# Patient Record
Sex: FEMALE | Employment: FULL TIME | URBAN - METROPOLITAN AREA
[De-identification: names, ages, dates, MRNs, and addresses within clinical notes are randomized per-mention and may not be internally consistent; named-entity substitution may affect disease eponyms.]

---

## 2017-02-03 PROBLEM — R56.9 SEIZURE (HCC): Status: ACTIVE | Noted: 2017-02-03

## 2017-02-03 PROBLEM — G43.009 NONINTRACTABLE MIGRAINE, UNSPECIFIED MIGRAINE TYPE: Status: ACTIVE | Noted: 2017-02-03

## 2018-06-14 PROBLEM — M75.101 ROTATOR CUFF SYNDROME OF RIGHT SHOULDER: Status: ACTIVE | Noted: 2018-06-14

## 2019-11-02 ENCOUNTER — HOSPITAL ENCOUNTER (EMERGENCY)
Facility: HOSPITAL | Age: 59
Discharge: HOME | End: 2019-11-02
Attending: EMERGENCY MEDICINE
Payer: COMMERCIAL

## 2019-11-02 ENCOUNTER — APPOINTMENT (OUTPATIENT)
Dept: CT IMAGING | Facility: HOSPITAL | Age: 59
End: 2019-11-02
Payer: COMMERCIAL

## 2019-11-02 VITALS
RESPIRATION RATE: 18 BRPM | SYSTOLIC BLOOD PRESSURE: 146 MMHG | TEMPERATURE: 98 F | BODY MASS INDEX: 28 KG/M2 | HEART RATE: 69 BPM | WEIGHT: 150 LBS | OXYGEN SATURATION: 93 % | DIASTOLIC BLOOD PRESSURE: 85 MMHG

## 2019-11-02 DIAGNOSIS — S06.0X0A CONCUSSION WITHOUT LOSS OF CONSCIOUSNESS, INITIAL ENCOUNTER: Primary | ICD-10-CM

## 2019-11-02 PROCEDURE — 70450 CT HEAD/BRAIN W/O DYE: CPT

## 2019-11-02 PROCEDURE — 99284 EMERGENCY DEPT VISIT MOD MDM: CPT

## 2019-11-02 NOTE — ED INITIAL ASSESSMENT (HPI)
Fall on Wednesday. Hit head. No LOC. Sent here by PMD.  Pt has dizziness, feels like she is on drugs.

## 2019-11-02 NOTE — ED PROVIDER NOTES
Patient Seen in: [facility] Emergency Department      History   Patient presents with:  Trauma (cardiovascular, musculoskeletal)    Stated Complaint: head injury    HPI    57-year-old female comes to the hospital complaint of having difficulty with except as noted above.     Physical Exam     ED Triage Vitals [11/02/19 1211]   /84   Pulse 65   Resp 18   Temp 98 °F (36.7 °C)   Temp src    SpO2 97 %   O2 Device None (Room air)       Current:/71   Pulse 59   Temp 98 °F (36.7 °C)   Resp 18   W Tristian Evangelista MD on 11/02/2019 at 13:42     Approved by: Fabrice Hilliard MD on 11/02/2019 at 13:42            Patient symptoms are consistent with concussion. Patient discharged home with instructions for further outpatient management at this time.         MDM

## 2019-12-09 PROBLEM — S66.902A: Status: ACTIVE | Noted: 2019-12-09

## 2019-12-09 PROBLEM — M79.644 FINGER PAIN, RIGHT: Status: ACTIVE | Noted: 2019-12-09

## 2019-12-09 PROBLEM — M19.049 ARTHRITIS OF FINGER: Status: ACTIVE | Noted: 2019-12-09

## 2020-11-04 NOTE — H&P (VIEW-ONLY)
11/4/2020    Patient presents with:  Consult: colonoscopy       HPI:    Vida Ferreira is a 62year old female who presents today for colonoscopy evaluation. Patient denies any abdominal pain. No change in bowel movements. No rectal bleeding.   No fam Onset   • Breast Cancer Self 47        DCIS       Social History    Tobacco Use      Smoking status: Never Smoker      Smokeless tobacco: Never Used    Alcohol use: Yes      Comment: Socially    Drug use: No      ROS:    10 point review performed with pert

## 2020-11-22 ENCOUNTER — APPOINTMENT (OUTPATIENT)
Dept: LAB | Facility: HOSPITAL | Age: 60
End: 2020-11-22
Attending: SURGERY
Payer: COMMERCIAL

## 2020-11-22 DIAGNOSIS — Z12.11 SCREEN FOR COLON CANCER: ICD-10-CM

## 2020-11-24 ENCOUNTER — ANESTHESIA EVENT (OUTPATIENT)
Dept: ENDOSCOPY | Facility: HOSPITAL | Age: 60
End: 2020-11-24
Payer: COMMERCIAL

## 2020-11-24 ENCOUNTER — ANESTHESIA (OUTPATIENT)
Dept: ENDOSCOPY | Facility: HOSPITAL | Age: 60
End: 2020-11-24
Payer: COMMERCIAL

## 2020-11-24 ENCOUNTER — HOSPITAL ENCOUNTER (OUTPATIENT)
Facility: HOSPITAL | Age: 60
Setting detail: HOSPITAL OUTPATIENT SURGERY
Discharge: HOME | End: 2020-11-24
Attending: SURGERY | Admitting: SURGERY
Payer: COMMERCIAL

## 2020-11-24 VITALS
HEART RATE: 64 BPM | OXYGEN SATURATION: 97 % | SYSTOLIC BLOOD PRESSURE: 106 MMHG | DIASTOLIC BLOOD PRESSURE: 66 MMHG | TEMPERATURE: 98 F | RESPIRATION RATE: 16 BRPM

## 2020-11-24 DIAGNOSIS — Z12.11 SCREEN FOR COLON CANCER: Primary | ICD-10-CM

## 2020-11-24 DIAGNOSIS — K59.00 CONSTIPATION, UNSPECIFIED CONSTIPATION TYPE: ICD-10-CM

## 2020-11-24 PROCEDURE — 0DJD8ZZ INSPECTION OF LOWER INTESTINAL TRACT, VIA NATURAL OR ARTIFICIAL OPENING ENDOSCOPIC: ICD-10-PCS | Performed by: SURGERY

## 2020-11-24 RX ORDER — SODIUM CHLORIDE, SODIUM LACTATE, POTASSIUM CHLORIDE, CALCIUM CHLORIDE 600; 310; 30; 20 MG/100ML; MG/100ML; MG/100ML; MG/100ML
INJECTION, SOLUTION INTRAVENOUS CONTINUOUS
Status: CANCELLED | OUTPATIENT
Start: 2020-11-24

## 2020-11-24 RX ORDER — SODIUM CHLORIDE, SODIUM LACTATE, POTASSIUM CHLORIDE, CALCIUM CHLORIDE 600; 310; 30; 20 MG/100ML; MG/100ML; MG/100ML; MG/100ML
INJECTION, SOLUTION INTRAVENOUS CONTINUOUS
Status: DISCONTINUED | OUTPATIENT
Start: 2020-11-24 | End: 2020-11-24

## 2020-11-24 RX ORDER — NALOXONE HYDROCHLORIDE 0.4 MG/ML
80 INJECTION, SOLUTION INTRAMUSCULAR; INTRAVENOUS; SUBCUTANEOUS AS NEEDED
Status: CANCELLED | OUTPATIENT
Start: 2020-11-24 | End: 2020-11-24

## 2020-11-24 RX ORDER — LIDOCAINE HYDROCHLORIDE 10 MG/ML
INJECTION, SOLUTION EPIDURAL; INFILTRATION; INTRACAUDAL; PERINEURAL AS NEEDED
Status: DISCONTINUED | OUTPATIENT
Start: 2020-11-24 | End: 2020-11-24 | Stop reason: SURG

## 2020-11-24 RX ADMIN — LIDOCAINE HYDROCHLORIDE 50 MG: 10 INJECTION, SOLUTION EPIDURAL; INFILTRATION; INTRACAUDAL; PERINEURAL at 12:15:00

## 2020-11-24 RX ADMIN — SODIUM CHLORIDE, SODIUM LACTATE, POTASSIUM CHLORIDE, CALCIUM CHLORIDE: 600; 310; 30; 20 INJECTION, SOLUTION INTRAVENOUS at 12:37:00

## 2020-11-24 NOTE — ANESTHESIA PREPROCEDURE EVALUATION
PRE-OP EVALUATION    Patient Name: Justine Wong    Pre-op Diagnosis: Screen for colon cancer [Z12.11]    Procedure(s):  COLONOSCOPY POSSIBLE BIOPSY POSSIBLE POLYPECTOMY    Surgeon(s) and Role:     Balwinder Clarke MD - Primary    Pre-op vitals reviewed ROM: full Cardiovascular    Cardiovascular exam normal.         Dental    No notable dental history. Pulmonary    Pulmonary exam normal.                 Other findings            ASA: 2   Plan: MAC  NPO status verified and patient meets guidelines.

## 2020-11-24 NOTE — OPERATIVE REPORT
[facility]    PATIENT'S NAME: Viviana Hernandez   ATTENDING PHYSICIAN: Kennedi Goel M.D. OPERATING PHYSICIAN: Kennedi Goel M.D.    PATIENT ACCOUNT#:   [de-identified]    LOCATION:  ENDO  ENDO POOL ROOMS 5 EDWP  MEDICAL RECORD #:   [id number]       DATE OF M.D.  d: 11/24/2020 12:40:28  t: 11/24/2020 13:06:20  [facility] [id number]  /

## 2020-11-24 NOTE — BRIEF OP NOTE
Pre-Operative Diagnosis: Screen for colon cancer [Z12.11]     Post-Operative Diagnosis: diverticulosis and incomplete colonoscopy       Procedure Performed:   Procedure(s):  COLONOSCOPY    Surgeon(s) and Role:     Kadeem Hoffman MD - Primary    Assistant(s

## 2020-11-24 NOTE — INTERVAL H&P NOTE
Pre-op Diagnosis: Screen for colon cancer [Z12.11]    The above referenced H&P was reviewed by Gabrielle Nickerson MD on 11/24/2020, the patient was examined and no significant changes have occurred in the patient's condition since the H&P was performed.   I discus

## 2021-04-10 ENCOUNTER — APPOINTMENT (OUTPATIENT)
Dept: GENERAL RADIOLOGY | Facility: HOSPITAL | Age: 61
DRG: 177 | End: 2021-04-10
Attending: EMERGENCY MEDICINE
Payer: COMMERCIAL

## 2021-04-10 ENCOUNTER — HOSPITAL ENCOUNTER (INPATIENT)
Facility: HOSPITAL | Age: 61
LOS: 5 days | Discharge: HOME | DRG: 177 | End: 2021-04-15
Attending: EMERGENCY MEDICINE | Admitting: HOSPITALIST
Payer: COMMERCIAL

## 2021-04-10 DIAGNOSIS — R09.02 HYPOXIA: ICD-10-CM

## 2021-04-10 DIAGNOSIS — J12.82 PNEUMONIA DUE TO COVID-19 VIRUS: Primary | ICD-10-CM

## 2021-04-10 DIAGNOSIS — U07.1 PNEUMONIA DUE TO COVID-19 VIRUS: Primary | ICD-10-CM

## 2021-04-10 PROCEDURE — 71045 X-RAY EXAM CHEST 1 VIEW: CPT | Performed by: EMERGENCY MEDICINE

## 2021-04-10 PROCEDURE — XW033E5 INTRODUCTION OF REMDESIVIR ANTI-INFECTIVE INTO PERIPHERAL VEIN, PERCUTANEOUS APPROACH, NEW TECHNOLOGY GROUP 5: ICD-10-PCS | Performed by: INTERNAL MEDICINE

## 2021-04-10 PROCEDURE — 3E0333Z INTRODUCTION OF ANTI-INFLAMMATORY INTO PERIPHERAL VEIN, PERCUTANEOUS APPROACH: ICD-10-PCS | Performed by: INTERNAL MEDICINE

## 2021-04-10 RX ORDER — LATANOPROST 50 UG/ML
1 SOLUTION/ DROPS OPHTHALMIC NIGHTLY
Status: DISCONTINUED | OUTPATIENT
Start: 2021-04-10 | End: 2021-04-15

## 2021-04-10 RX ORDER — ENOXAPARIN SODIUM 100 MG/ML
40 INJECTION SUBCUTANEOUS DAILY
Status: DISCONTINUED | OUTPATIENT
Start: 2021-04-10 | End: 2021-04-15

## 2021-04-10 RX ORDER — DEXAMETHASONE SODIUM PHOSPHATE 4 MG/ML
6 VIAL (ML) INJECTION ONCE
Status: COMPLETED | OUTPATIENT
Start: 2021-04-10 | End: 2021-04-10

## 2021-04-10 RX ORDER — LAMOTRIGINE 25 MG/1
75 TABLET ORAL NIGHTLY
Status: DISCONTINUED | OUTPATIENT
Start: 2021-04-10 | End: 2021-04-11

## 2021-04-10 RX ORDER — ALBUTEROL SULFATE 90 UG/1
4 AEROSOL, METERED RESPIRATORY (INHALATION) EVERY 4 HOURS PRN
Status: DISCONTINUED | OUTPATIENT
Start: 2021-04-10 | End: 2021-04-15

## 2021-04-10 RX ORDER — GUAIFENESIN 600 MG
600 TABLET, EXTENDED RELEASE 12 HR ORAL 2 TIMES DAILY
Status: DISCONTINUED | OUTPATIENT
Start: 2021-04-10 | End: 2021-04-15

## 2021-04-10 RX ORDER — AMITRIPTYLINE HYDROCHLORIDE 10 MG/1
10 TABLET, FILM COATED ORAL NIGHTLY
Status: DISCONTINUED | OUTPATIENT
Start: 2021-04-10 | End: 2021-04-10

## 2021-04-10 RX ORDER — ACETAMINOPHEN 500 MG
1000 TABLET ORAL ONCE
Status: COMPLETED | OUTPATIENT
Start: 2021-04-10 | End: 2021-04-10

## 2021-04-10 RX ORDER — DEXAMETHASONE SODIUM PHOSPHATE 4 MG/ML
6 VIAL (ML) INJECTION EVERY 24 HOURS
Status: DISCONTINUED | OUTPATIENT
Start: 2021-04-11 | End: 2021-04-12

## 2021-04-10 RX ORDER — ZINC SULFATE 50(220)MG
220 CAPSULE ORAL 2 TIMES DAILY
Status: DISCONTINUED | OUTPATIENT
Start: 2021-04-10 | End: 2021-04-15

## 2021-04-10 RX ORDER — ASCORBIC ACID 500 MG
1000 TABLET ORAL DAILY
Status: DISCONTINUED | OUTPATIENT
Start: 2021-04-10 | End: 2021-04-15

## 2021-04-10 RX ORDER — LAMOTRIGINE 100 MG/1
100 TABLET ORAL DAILY
Status: DISCONTINUED | OUTPATIENT
Start: 2021-04-10 | End: 2021-04-10

## 2021-04-10 RX ORDER — ACETAMINOPHEN 160 MG
2000 TABLET,DISINTEGRATING ORAL DAILY
Status: DISCONTINUED | OUTPATIENT
Start: 2021-04-10 | End: 2021-04-15

## 2021-04-10 RX ORDER — BRIMONIDINE TARTRATE 2 MG/ML
1 SOLUTION/ DROPS OPHTHALMIC 2 TIMES DAILY
Status: DISCONTINUED | OUTPATIENT
Start: 2021-04-10 | End: 2021-04-15

## 2021-04-10 RX ORDER — SODIUM CHLORIDE 9 MG/ML
INJECTION, SOLUTION INTRAVENOUS CONTINUOUS
Status: ACTIVE | OUTPATIENT
Start: 2021-04-10 | End: 2021-04-10

## 2021-04-10 RX ORDER — LAMOTRIGINE 100 MG/1
100 TABLET ORAL NIGHTLY
Status: DISCONTINUED | OUTPATIENT
Start: 2021-04-10 | End: 2021-04-10

## 2021-04-10 NOTE — ED QUICK NOTES
Report to [facility]. Transport paged. Halima informed of 3rd troponin draw at 1659    Pt updated on room number and eta to floor. No distress noted at this time. Pt denies any needs.

## 2021-04-10 NOTE — H&P
[facility]  History and Physical     Terris Fess Patient Status:  Inpatient    1960 MRN [id number]   [facility] [place] 5NW-A Attending Sterling Quijano MD   Hosp Day # 0 PCP None Pcp     Chief Complaint: Shortness of breath coug Age 3       Social History:  reports that she has never smoked. She has never used smokeless tobacco. She reports current alcohol use. She reports current drug use. Drug: Cannabis.     Family History:   Family History   Problem Relation Age of Onset   • Isabel murmurs, rubs or gallops. Equal pulses. Chest and Back: No tenderness or deformity. Abdomen: Soft, nontender, nondistended. Positive bowel sounds. No rebound, guarding or organomegaly. Neurologic: No focal neurological deficits.  CNII-XII grossly intac patient and ED physician    Susana Vergara MD  4/10/2021

## 2021-04-10 NOTE — ED PROVIDER NOTES
Patient Seen in: [facility] Emergency Department      History   Patient presents with:  Covid    Stated Complaint: COVID Positive. In bed x 8 days. Decreased intake.     HPI/Subjective:   HPI    66-year-old female presents emerged with chief compla Smoking status: Never Smoker      Smokeless tobacco: Never Used    Vaping Use      Vaping Use: Some days        Substances: THC    Alcohol use: Yes      Comment: Socially    Drug use:  No             Review of Systems    Positive for stated complaint: COVID components:     (*)     All other components within normal limits   C-REACTIVE PROTEIN - Abnormal; Notable for the following components:    C-Reactive Protein 11.10 (*)     All other components within normal limits   FERRITIN - Abnormal; Notable for IV established, placed on cardiac monitor and pulse ox. Patient given IV fluid hydration. Patient was noted to be hypoxic with oxygen saturation of 88% when speaking, placed on supplemental nasal cannula oxygen.   Chest x-ray performed which revealed mode

## 2021-04-10 NOTE — CONSULTS
INFECTIOUS DISEASE TELEMEDICINE CONSULT NOTE    Christiannemadhu Romero Patient Status:  Inpatient    1960 MRN [id number]   [facility] 5NW-A Attending Myla Boswell MD   [facility] Day # 0 P used smokeless tobacco. She reports current alcohol use. She reports current drug use. Drug: Cannabis.     Allergies:  No Known Allergies    Medications:    Current Facility-Administered Medications:   •  0.9% NaCl infusion, , Intravenous, Continuous  •  Am admission)    Reviewed    Imaging:  X-Ray    Imaging results reviewed     Impression:  Patient Active Problem List:     Plantar fascial fibromatosis     Seizure (HCC)     Nonintractable migraine, unspecified migraine type     Rotator cuff syndrome of right

## 2021-04-11 PROCEDURE — 99232 SBSQ HOSP IP/OBS MODERATE 35: CPT | Performed by: STUDENT IN AN ORGANIZED HEALTH CARE EDUCATION/TRAINING PROGRAM

## 2021-04-11 RX ORDER — LAMOTRIGINE 25 MG/1
75 TABLET ORAL DAILY
Status: DISCONTINUED | OUTPATIENT
Start: 2021-04-11 | End: 2021-04-15

## 2021-04-11 RX ORDER — ONDANSETRON 2 MG/ML
4 INJECTION INTRAMUSCULAR; INTRAVENOUS EVERY 4 HOURS PRN
Status: DISCONTINUED | OUTPATIENT
Start: 2021-04-11 | End: 2021-04-15

## 2021-04-11 RX ORDER — BENZONATATE 100 MG/1
100 CAPSULE ORAL 3 TIMES DAILY PRN
Status: DISCONTINUED | OUTPATIENT
Start: 2021-04-11 | End: 2021-04-15

## 2021-04-11 NOTE — COVID NURSING ASSESSMENT
COVID-19 Daily Discharge Readiness-Nursing    O2 Sat at Rest:     97%  2L NC  O2 Sat with Exertion:    % on    liters   Temperature max from last 24 hrs: Temp (24hrs), Av °F (37.2 °C), Min:98.1 °F (36.7 °C), Max:100.6 °F (38.1 °C)    Inflammatory Marke

## 2021-04-11 NOTE — PROGRESS NOTES
INFECTIOUS DISEASE TELEMEDICINE PROGRESS NOTE    Esdras Jacobso Patient Status:  Inpatient    1960 MRN [id number]   [facility] 5NW-A Attending Michelle oYu MD   [facility] Day # 1 pressure 105/60, pulse 63, temperature 97.8 °F (36.6 °C), temperature source Oral, resp. rate 18, weight 155 lb 6.8 oz (70.5 kg), last menstrual period 01/06/2008, SpO2 93 %, not currently breastfeeding.     Physical examination deferred    Laboratory Data:

## 2021-04-11 NOTE — COVID NURSING ASSESSMENT
COVID-19 Daily Discharge Readiness-Nursing    O2 Sat at Rest:     92% on 1L NC   O2 Sat with Exertion:  Pts O2 desatted to 83% on 1L NC, requiring 4L NC to maintain oxygen saturation above 90%  Temperature max from last 24 hrs: Temp (24hrs), Av.1 °F (3 plan of care.

## 2021-04-11 NOTE — PLAN OF CARE
Problem: Patient/Family Goals  Goal: Patient/Family Long Term Goal  Description: Patient's Long Term Goal: To go home    Interventions:  - remdesivir  -I/S  -Prone  -Steroids  - See additional Care Plan goals for specific interventions  Outcome: Progress

## 2021-04-11 NOTE — PROGRESS NOTES
[facility]  Progress Note     Christianne Romero Patient Status:  Inpatient    1960 MRN [id number]   [facility] 5NW-A Attending Reece Friedman MD   Hosp Day # 1 PCP None Pcp     Chief Complaint: COVID     S: Patient  Feels wea 04/10/21  1107   PCT 0.05       Cardiac  Recent Labs   Lab 04/10/21  1107 04/10/21  1247 04/10/21  1703   TROP <0.045 <0.045 <0.045   PBNP 132*  --   --        Creatinine Kinase  Recent Labs   Lab 04/10/21  1107          Inflammatory Markers  Recent

## 2021-04-12 PROCEDURE — 99232 SBSQ HOSP IP/OBS MODERATE 35: CPT | Performed by: STUDENT IN AN ORGANIZED HEALTH CARE EDUCATION/TRAINING PROGRAM

## 2021-04-12 RX ORDER — PANTOPRAZOLE SODIUM 40 MG/1
40 TABLET, DELAYED RELEASE ORAL
Status: DISCONTINUED | OUTPATIENT
Start: 2021-04-12 | End: 2021-04-15

## 2021-04-12 RX ORDER — ECHINACEA PURPUREA EXTRACT 125 MG
1 TABLET ORAL
Status: DISCONTINUED | OUTPATIENT
Start: 2021-04-12 | End: 2021-04-15

## 2021-04-12 RX ORDER — ACETAMINOPHEN 325 MG/1
650 TABLET ORAL EVERY 6 HOURS PRN
Status: DISCONTINUED | OUTPATIENT
Start: 2021-04-12 | End: 2021-04-15

## 2021-04-12 RX ORDER — DOCUSATE SODIUM 100 MG/1
100 CAPSULE, LIQUID FILLED ORAL 2 TIMES DAILY
Status: DISCONTINUED | OUTPATIENT
Start: 2021-04-12 | End: 2021-04-15

## 2021-04-12 RX ORDER — DEXAMETHASONE SODIUM PHOSPHATE 4 MG/ML
6 VIAL (ML) INJECTION
Status: DISCONTINUED | OUTPATIENT
Start: 2021-04-13 | End: 2021-04-15

## 2021-04-12 NOTE — COVID NURSING ASSESSMENT
COVID-19 Daily Discharge Readiness-Nursing    O2 Sat at Rest:  92% on 2L   O2 Sat with Exertion: 91% on 5L  Temperature max from last 24 hrs: Temp (24hrs), Av.1 °F (36.7 °C), Min:98 °F (36.7 °C), Max:98.2 °F (36.8 °C)    Inflammatory Markers:   Recent diagnosis. .. [x] Pneumonia     [] COPD    [] Home Health set up. [x] Care partner identified and updated with the plan of care.

## 2021-04-12 NOTE — CM/SW NOTE
04/12/21 1434   CM/SW Screening   Referral [id number] staff; Chart review;Nursing rounds   Patient's Mental Status Alert;Oriented   Patient's [address]   Patient lives with Spouse   Patient Status Prior to A

## 2021-04-12 NOTE — PLAN OF CARE
Assumed carre at [address]. Pt a/ox4, soft spoken. VSS. NSR/SB per tele. O2 at 2-4l/nc in use. SOB w/ activity. On day 2 decadron and remdesivir. Denies pain. Pt updated w/ POC. Call light in reach. Needs attended to. Contact/droplet isolation maintained.  Will

## 2021-04-12 NOTE — PLAN OF CARE
Problem: Patient/Family Goals  Goal: Patient/Family Long Term Goal  Description: Patient's Long Term Goal: To go home    Interventions:  - remdesivir  -I/S  -Prone  -Steroids  - See additional Care Plan goals for specific interventions  Outcome: Progress and/or relaxation techniques  - Monitor for opioid side effects  - Notify MD/LIP if interventions unsuccessful or patient reports new pain  - Anticipate increased pain with activity and pre-medicate as appropriate  Outcome: Progressing

## 2021-04-12 NOTE — PAYOR COMM NOTE
Appropriate for inpatient status per guidelines for cough, SOB and fatigue due to COVID pneumonia.      --------------  ADMISSION REVIEW     Payor: [address] [place] [address] #:  [id number]  Authorization Number: [id number]       ED Provider Notes SURGERY PROCEDURE  Age 23   • STEREOTACTIC BREAST BIOPSY  12-13-07 [facility]    Bilateral   • STEREOTACTIC BREAST BIOPSY  2-16-09 [facility]    Right Breast 10 o'clock posterior   • TONSILLECTOMY  Age 3       Review of Systems    Positive for stated complai components:     (*)     All other components within normal limits   C-REACTIVE PROTEIN - Abnormal; Notable for the following components:    C-Reactive Protein 11.10 (*)     All other components within normal limits   FERRITIN - Abnormal; Notable for is a 56year old female with 80s history of generalized weakness fatigue shortness of breath. Patient was tested for Covid at the local pharmacy and she was +8 days ago.   She is also complaining of nausea decreased appetite and oral intake as well as loos Estimated Creatinine Clearance: 51.8 mL/min (based on SCr of 0.83 mg/dL).     COVID-19  Lab Results   Component Value Date    COVID19 Detected (A) 04/10/2021    COVID19 Not Detected 11/22/2020       Pro-Calcitonin  Recent Labs   Lab 04/10/21  1107   P markers   3. Steroids   4. Remdesivir 4/10  5. ID on CS  2. Diarrhea   3. HypoNa  4. H/o migraines  5. H/o breast ca                4/11 INF DIS    ASSESSMENT:     1. COVID-19 pneumonia  2. Acute hypoxia  3. Elevated inflammatory markers  4.  Liver enzyme e

## 2021-04-12 NOTE — PROGRESS NOTES
INFECTIOUS DISEASE TELEMEDICINE PROGRESS NOTE    Kirstinchelle Jauregui Patient Status:  Inpatient    1960 MRN [id number]   [facility] 5NW-A Attending Yonatan Almaraz MD   [facility] Day # 2 MCG/ACT inhaler 4 puff, 4 puff, Inhalation, Q4H PRN  •  zinc sulfate (ZINCATE) cap 220 mg, 220 mg, Oral, BID  •  Vitamin D3 (Cholecalciferol) cap 2,000 Units, 2,000 Units, Oral, Daily  •  ascorbic acid (VITAMIN C) tab 1,000 mg, 1,000 mg, Oral, Daily    Rev Elevated inflammatory markers  4. Liver enzyme elevation-resolved  5. BRCA history  6.  Loose stools- rule out C.difficile vs. COVID-19 associated       PLAN:    -Continue Remdesivir day 3 for treatment of COVID-19 pneumonia  -Continue Dexamethasone day 3 f

## 2021-04-12 NOTE — PROGRESS NOTES
[facility]  Progress Note     Jenaro Newby Patient Status:  Inpatient    1960 MRN [id number]   [facility] 5NW-A Attending Sari Blackwell MD   Hosp Day # 2 PCP None Pcp     Chief Complaint: COVID     S: Patient  Weak, dry Detected (A) 04/10/2021    COVID19 Not Detected 11/22/2020       Pro-Calcitonin  Recent Labs   Lab 04/10/21  1107   PCT 0.05       Cardiac  Recent Labs   Lab 04/10/21  1107 04/10/21  1247 04/10/21  1703   TROP <0.045 <0.045 <0.045   PBNP 132*  --   --

## 2021-04-13 ENCOUNTER — APPOINTMENT (OUTPATIENT)
Dept: CT IMAGING | Facility: HOSPITAL | Age: 61
DRG: 177 | End: 2021-04-13
Attending: INTERNAL MEDICINE
Payer: COMMERCIAL

## 2021-04-13 ENCOUNTER — APPOINTMENT (OUTPATIENT)
Dept: GENERAL RADIOLOGY | Facility: HOSPITAL | Age: 61
DRG: 177 | End: 2021-04-13
Attending: STUDENT IN AN ORGANIZED HEALTH CARE EDUCATION/TRAINING PROGRAM
Payer: COMMERCIAL

## 2021-04-13 PROCEDURE — 99232 SBSQ HOSP IP/OBS MODERATE 35: CPT | Performed by: STUDENT IN AN ORGANIZED HEALTH CARE EDUCATION/TRAINING PROGRAM

## 2021-04-13 PROCEDURE — 71275 CT ANGIOGRAPHY CHEST: CPT | Performed by: INTERNAL MEDICINE

## 2021-04-13 PROCEDURE — 74018 RADEX ABDOMEN 1 VIEW: CPT | Performed by: STUDENT IN AN ORGANIZED HEALTH CARE EDUCATION/TRAINING PROGRAM

## 2021-04-13 RX ORDER — BISACODYL 10 MG
10 SUPPOSITORY, RECTAL RECTAL ONCE
Status: COMPLETED | OUTPATIENT
Start: 2021-04-13 | End: 2021-04-13

## 2021-04-13 RX ORDER — POLYETHYLENE GLYCOL 3350 17 G/17G
17 POWDER, FOR SOLUTION ORAL DAILY
Status: DISCONTINUED | OUTPATIENT
Start: 2021-04-13 | End: 2021-04-15

## 2021-04-13 RX ORDER — SORBITOL SOLUTION 70 %
30 SOLUTION, ORAL MISCELLANEOUS ONCE
Status: COMPLETED | OUTPATIENT
Start: 2021-04-13 | End: 2021-04-13

## 2021-04-13 NOTE — PROGRESS NOTES
COVID-19 Daily Discharge Readiness-Nursing    O2 Sat at Rest:     93% on 2L NC  O2 Sat with Exertion:    % on    liters   Temperature max from last 24 hrs: Temp (24hrs), Av.8 °F (36.6 °C), Min:97.3 °F (36.3 °C), Max:98.1 °F (36.7 °C)    Inflammatory Ma

## 2021-04-13 NOTE — PROGRESS NOTES
[facility]  Progress Note     Esdras Roger Patient Status:  Inpatient    1960 MRN [id number]   [facility] 5NW-A Attending Ronna Owen MD   Hosp Day # 3 PCP None Pcp     Chief Complaint: COVID     S: Patient  W/ some c Value Date    COVID19 Detected (A) 04/10/2021    COVID19 Not Detected 11/22/2020       Pro-Calcitonin  Recent Labs   Lab 04/10/21  1107   PCT 0.05       Cardiac  Recent Labs   Lab 04/10/21  1107 04/10/21  1107 04/10/21  1247 04/10/21  1703 04/13/21  0018 to: home    Plan of care discussed with pt, RN    Clarissa Alex MD

## 2021-04-13 NOTE — PROGRESS NOTES
INFECTIOUS DISEASE TELEMEDICINE PROGRESS NOTE    Jenaro Newby Patient Status:  Inpatient    1960 MRN [id number]   [facility] 5NW-A Attending Tia Franklin MD   [facility] Day # 3 600 mg, 600 mg, Oral, BID  •  Albuterol Sulfate  (90 Base) MCG/ACT inhaler 4 puff, 4 puff, Inhalation, Q4H PRN  •  zinc sulfate (ZINCATE) cap 220 mg, 220 mg, Oral, BID  •  Vitamin D3 (Cholecalciferol) cap 2,000 Units, 2,000 Units, Oral, Daily  •  as Remdesivir day 4 for treatment of COVID-19 pneumonia  -Continue Dexamethasone day 4 for hypoxia attributable to COVID-19  -Follow CBC, CMP, inflammatory markers  -Encourage incentive spirometry and prone positioning while in bed  -follow O2 sats, would con

## 2021-04-13 NOTE — PROGRESS NOTES
04/13/21 0058   Provider Notification   Reason for Communication Change in status  (Mid sternal pain/ Abnormal EKG. )   Provider Name Bentley Galloway MD;Other (comment)  (Dr. Scar Drummond)   Method of Communication Page   Response Waiting for response   Notific

## 2021-04-13 NOTE — COVID NURSING ASSESSMENT
COVID-19 Daily Discharge Readiness-Nursing    O2 Sat at Rest:  93% 3L  Temperature max from last 24 hrs: Temp (24hrs), Av.9 °F (36.6 °C), Min:97.3 °F (36.3 °C), Max:98.2 °F (36.8 °C)    Inflammatory Markers:   Recent Labs   Lab 21  0731 21 [] Other Specialty    Watched the home discharge recovery videos related to diagnosis. .. [x] Pneumonia     [] COPD        [x] Care partner identified and updated with the plan of care.

## 2021-04-14 PROCEDURE — 99232 SBSQ HOSP IP/OBS MODERATE 35: CPT | Performed by: STUDENT IN AN ORGANIZED HEALTH CARE EDUCATION/TRAINING PROGRAM

## 2021-04-14 RX ORDER — SIMETHICONE 80 MG
80 TABLET,CHEWABLE ORAL 4 TIMES DAILY PRN
Status: DISCONTINUED | OUTPATIENT
Start: 2021-04-14 | End: 2021-04-15

## 2021-04-14 RX ORDER — PROCHLORPERAZINE EDISYLATE 5 MG/ML
10 INJECTION INTRAMUSCULAR; INTRAVENOUS EVERY 6 HOURS PRN
Status: DISCONTINUED | OUTPATIENT
Start: 2021-04-14 | End: 2021-04-15

## 2021-04-14 NOTE — PROGRESS NOTES
[facility]  Progress Note     Danette Kc Patient Status:  Inpatient    1960 MRN [id number]   [facility] 5NW-A Attending Lamont De Los Santos MD   Hosp Day # 4 PCP None Pcp     Chief Complaint: COVID     S: Patient had small Results    COVID-19  Lab Results   Component Value Date    COVID19 Detected (A) 04/10/2021    COVID19 Not Detected 11/22/2020       Pro-Calcitonin  Recent Labs   Lab 04/10/21  1107   PCT 0.05       Cardiac  Recent Labs   Lab 04/10/21  1107 04/10/21  1107 0 discharge?: no  Estimated date of discharge: tbd  Discharge is dependent on: clnical   At this point Ms. Radha Powell is expected to be discharge to: home    Plan of care discussed with pt, RN    Yogesh Figueroa MD

## 2021-04-14 NOTE — PLAN OF CARE
Pt is alert and oriented. O2 is WNL on 2L at rest.  Needs 5L with exertion. VSS. C/o stomach upset this AM.  Dry heaving in bed. PRN Zofran given. MD aware. Compazine if needed. Remedesivir day 5 today. Will continue to monitor.   1600:  Pt up walki guidelines  Outcome: Progressing     Problem: SAFETY ADULT - FALL  Goal: Free from fall injury  Description: INTERVENTIONS:  - Assess pt frequently for physical needs  - Identify cognitive and physical deficits and behaviors that affect risk of falls.   - I time for understanding and response  - Establish method for patient to ask for assistance (call light)  - Provide an  as needed  - Communicate barriers and strategies to overcome with those who interact with patient  Outcome: Progressing

## 2021-04-14 NOTE — COVID NURSING ASSESSMENT
COVID-19 Daily Discharge Readiness-Nursing    O2 Sat at Rest:  96% 2.5L.    O2 Sat with Exertion:   89 % on  2.5liters   Temperature max from last 24 hrs: Temp (24hrs), Av.9 °F (36.6 °C), Min:97.7 °F (36.5 °C), Max:98.1 °F (36.7 °C)    Inflammatory [facility]     []? Pulmonologist     []? Primary Care Physician     []? Other Specialty     Watched the home discharge recovery videos related to diagnosis. .. [x]? Pneumonia/Covid. []? COPD           [x]?  Care partner identified

## 2021-04-14 NOTE — PROGRESS NOTES
INFECTIOUS DISEASE TELEMEDICINE PROGRESS NOTE    Sonam Wong Patient Status:  Inpatient    1960 MRN [id number]   [facility] 5NW-A Attending Chip Gann MD   [facility] Day # 4 Subcutaneous, Daily  •  remdesivir 100 mg in sodium chloride 0.9% 270 mL IVPB, 100 mg, Intravenous, Q24H  •  sodium chloride 0.9% IV bolus 500 mL, 500 mL, Intravenous, PRN  •  guaiFENesin ER (MUCINEX) 12 hr tab 600 mg, 600 mg, Oral, BID  •  Albuterol Sulfa Lab 04/10/21  1107          Inflammatory Markers  Recent Labs   Lab 04/11/21  0731 04/12/21  0635 04/13/21  1602   CRP 11.10* 5.46* 2.33*   NELIA 1,115.7* 1,040.7* 1,269.3*   * 302* 340*   DDIMER 0.49 0.29 0.34       Microbiologic Data:   (thi

## 2021-04-14 NOTE — COVID NURSING ASSESSMENT
COVID-19 Daily Discharge Readiness-Nursing    O2 Sat at Rest:     97% on 2L  O2 Sat with Exertion:    92% on     2 liters   Temperature max from last 24 hrs: Temp (24hrs), Av °F (36.7 °C), Min:97.7 °F (36.5 °C), Max:98.3 °F (36.8 °C)    Inflammatory Ma

## 2021-04-15 VITALS
OXYGEN SATURATION: 93 % | RESPIRATION RATE: 19 BRPM | SYSTOLIC BLOOD PRESSURE: 115 MMHG | TEMPERATURE: 98 F | WEIGHT: 160.63 LBS | HEART RATE: 88 BPM | BODY MASS INDEX: 31 KG/M2 | DIASTOLIC BLOOD PRESSURE: 60 MMHG

## 2021-04-15 PROCEDURE — 99238 HOSP IP/OBS DSCHRG MGMT 30/<: CPT | Performed by: STUDENT IN AN ORGANIZED HEALTH CARE EDUCATION/TRAINING PROGRAM

## 2021-04-15 RX ORDER — DEXAMETHASONE 6 MG/1
6 TABLET ORAL
Qty: 3 TABLET | Refills: 0 | Status: SHIPPED | OUTPATIENT
Start: 2021-04-16 | End: 2021-04-19 | Stop reason: CLARIF

## 2021-04-15 NOTE — DISCHARGE SUMMARY
[facility] HOSPITALIST  DISCHARGE SUMMARY     Nava Melo Patient Status:  Inpatient    1960 MRN [id number]   [facility] 5NW-A Attending Kaylee Alva MD   [facility] Day # 5 PCP None Pcp     Date of Admission: 4/10/2021  Date of Discha tablet  Refills: 0        CHANGE how you take these medications      Instructions Prescription details   lamoTRIgine 25 MG Tabs  Commonly known as: LAMICTAL  What changed:   · how much to take  · additional instructions      TAKE 4 TABLETS BY MOUTH DAILY instructions. If you have questions, call your doctor's office directly. If you are having issues or need to use a desktop/laptop, please follow the below steps:        1. Close out all other open apps (could be competing for audio resources)  2.

## 2021-04-15 NOTE — CM/SW NOTE
shannon notified of ZACHARY Calderon 44 home today. P Will need to watch for poss home o2 needs- will need orders/walk if required. No additional needs identified at this time.  shannon following

## 2021-04-15 NOTE — PROGRESS NOTES
INFECTIOUS DISEASE TELEMEDICINE PROGRESS NOTE    Sonam Wong Patient Status:  Inpatient    1960 MRN [id number]   [facility] 5NW-A Attending Chip Gann MD   [facility] Day # 5 (LOVENOX) 40 MG/0.4ML injection 40 mg, 40 mg, Subcutaneous, Daily  •  sodium chloride 0.9% IV bolus 500 mL, 500 mL, Intravenous, PRN  •  guaiFENesin ER (MUCINEX) 12 hr tab 600 mg, 600 mg, Oral, BID  •  Albuterol Sulfate  (90 Base) MCG/ACT inhaler 4 DDIMER 0.49 0.29 0.34       Microbiologic Data:   (this admission)    Reviewed    Imaging:  X-Ray    Imaging results reviewed         ASSESSMENT:    1. COVID-19 pneumonia  2. Acute hypoxia  3. Elevated inflammatory markers  4.  Liver enzyme elevation-reso

## 2021-04-15 NOTE — PROGRESS NOTES
[facility]  Progress Note     Vazquez Ana Patient Status:  Inpatient    1960 MRN [id number]   [facility] 5NW-A Attending Sebastien De Oliveira MD   Hosp Day # 5 PCP None Pcp     Chief Complaint: COVID     S: Patient today feel Lab Results    COVID-19  Lab Results   Component Value Date    COVID19 Detected (A) 04/10/2021    COVID19 Not Detected 11/22/2020       Pro-Calcitonin  Recent Labs   Lab 04/10/21  1107   PCT 0.05       Cardiac  Recent Labs   Lab 04/10/21  1107 04/10/21  11 expected to be discharge to: home    Plan of care discussed with pt, RN    Catherine Galan MD

## 2021-04-15 NOTE — COVID NURSING ASSESSMENT
COVID-19 Daily Discharge Readiness-Nursing    O2 Sat at Rest:    98 % on 2L NC     Temperature max from last 24 hrs: Temp (24hrs), Av.1 °F (36.7 °C), Min:97.8 °F (36.6 °C), Max:98.3 °F (36.8 °C)    Inflammatory Markers:   Recent Labs   Lab 21  06

## 2021-04-15 NOTE — PLAN OF CARE
Problem: Patient/Family Goals  Goal: Patient/Family Long Term Goal  Description: Patient's Long Term Goal: To go home    Interventions:  - remdesivir  -I/S  -Prone  -Steroids  - See additional Care Plan goals for specific interventions  Outcome: Progress precautions as indicated by assessment.  - Educate pt/family on patient safety including physical limitations  - Instruct pt to call for assistance with activity based on assessment  - Modify environment to reduce risk of injury  - Provide assistive device

## 2021-04-16 ENCOUNTER — PATIENT OUTREACH (OUTPATIENT)
Dept: CASE MANAGEMENT | Age: 61
End: 2021-04-16

## 2021-04-16 DIAGNOSIS — Z02.9 ENCOUNTERS FOR UNSPECIFIED ADMINISTRATIVE PURPOSE: ICD-10-CM

## 2021-04-16 NOTE — PROGRESS NOTES
Attempted to reach the patient to complete [facility]-Hospital FU call. Left a message for the pt to call NCM back at, [phone]. The number for the [facility] was also given to the patient: [phone]. The patient is scheduled with the [facility] on 4/19/2021.

## 2021-04-16 NOTE — PAYOR COMM NOTE
--------------  DISCHARGE REVIEW    Payor: [address] [place] [address] Drive #:  [id number]  Authorization Number: [id number]    Admit date: 4/10/21  Admit time:   1:18 PM  Discharge Date: 4/15/2021  8:06 PM     Admitting Physician: Francisco Kamara MD  Attending P

## 2021-04-16 NOTE — COVID NURSING ASSESSMENT
COVID-19 Daily Discharge Readiness-Nursing    O2 Sat at Rest:  SPO2% on Room Air at Rest: 93  %   O2 Sat with Exertion:  90  % on Ambulation oxygen flow (liters per minute):  (none)  liters   Temperature max from last 24 hrs: Temp (24hrs), Av.1 °F (36.

## 2021-04-19 ENCOUNTER — TELEMEDICINE (OUTPATIENT)
Dept: INTERNAL MEDICINE CLINIC | Facility: CLINIC | Age: 61
End: 2021-04-19

## 2021-04-19 DIAGNOSIS — U07.1 PNEUMONIA DUE TO COVID-19 VIRUS: Primary | ICD-10-CM

## 2021-04-19 DIAGNOSIS — R09.02 HYPOXIA: ICD-10-CM

## 2021-04-19 DIAGNOSIS — J12.82 PNEUMONIA DUE TO COVID-19 VIRUS: Primary | ICD-10-CM

## 2021-04-19 PROCEDURE — 99202 OFFICE O/P NEW SF 15 MIN: CPT | Performed by: NURSE PRACTITIONER

## 2021-04-19 RX ORDER — MULTIVIT WITH MINERALS/LUTEIN
1000 TABLET ORAL DAILY
Qty: 30 TABLET | Refills: 0 | Status: SHIPPED | OUTPATIENT
Start: 2021-04-19

## 2021-04-19 RX ORDER — LAMOTRIGINE 25 MG/1
75 TABLET ORAL DAILY
COMMUNITY
End: 2021-12-17

## 2021-04-19 RX ORDER — MULTIVIT-MIN/IRON/FOLIC ACID/K 18-600-40
1 CAPSULE ORAL DAILY
Qty: 30 TABLET | Refills: 0 | Status: SHIPPED | OUTPATIENT
Start: 2021-04-19 | End: 2021-05-19

## 2021-04-19 RX ORDER — ZINC SULFATE 50(220)MG
1 CAPSULE ORAL DAILY
Qty: 30 CAPSULE | Refills: 0 | Status: SHIPPED | OUTPATIENT
Start: 2021-04-19 | End: 2021-05-19

## 2021-04-19 NOTE — PROGRESS NOTES
[facility] planned to contact patient for TCM, however, a TCM-Hospital FU appointment was completed with the [facility] on 4/19/2021. [facility] closing encounter.

## 2021-04-19 NOTE — PROGRESS NOTES
Video Visit  [address]      Please note that the following visit was completed using two-way, real-time interactive audio and video communication.   This has been done in good tony to provide continuity of care in the best loss of taste and smell but is returning. She completed Dexamethasone yesterday, she is using IS and proning at night.  She has no other concerns at time of exam.     Interactive contact within 2 business days post discharge first initiated on Date: 4/16/20 Breast Lumpectomy   • NEEDLE BIOPSY LEFT  2007   • NEEDLE BIOPSY RIGHT  2007, 2009   • ORAL SURGERY PROCEDURE  Age 23   • STEREOTACTIC BREAST BIOPSY  12-13-07 [facility]    Bilateral   • STEREOTACTIC BREAST BIOPSY  2-16-09 [facility]    Right Breast 10 o'mell AM    BUN 15 04/15/2021 06:22 AM    CREATSERUM 0.62 04/15/2021 06:22 AM    CA 8.3 (L) 04/15/2021 06:22 AM    ALB 2.6 (L) 04/15/2021 06:22 AM    ALT 75 (H) 04/15/2021 06:22 AM    AST 25 04/15/2021 06:22 AM       Immunization History   Administered Date(s) A MCG (1000 UT) Oral Tab          Sig: Take 1 tablet by mouth daily. Dispense:  30 tablet          Refill:  0      Zinc 220 (50 Zn) MG Oral Cap          Sig: Take 1 capsule by mouth daily.           Dispense:  30 capsule          Refill:  0        Me Medical Decision Making- Based on service period of discharge to 30 days:   · Number of Possible Diagnoses and/or Management Options: mild  · Amount and/or Complexity of Data to Be Reviewed: mild  · Risk of Significant Complications, Morbidity, and/or Mo

## 2021-04-19 NOTE — PROGRESS NOTES
TRANSITIONAL CARE CLINIC PHARMACIST MEDICATION RECONCILIATION        Magdy Rogers MRN [id number]    1960 PCP None Pcp       Comments: Medication history completed by the [address] [place] [address] Pharmacist with the patient using two-waymaria alejandra

## 2021-05-03 ENCOUNTER — OFFICE VISIT (OUTPATIENT)
Dept: FAMILY MEDICINE CLINIC | Facility: CLINIC | Age: 61
End: 2021-05-03
Payer: COMMERCIAL

## 2021-05-03 VITALS
HEIGHT: 60 IN | RESPIRATION RATE: 14 BRPM | BODY MASS INDEX: 30.23 KG/M2 | SYSTOLIC BLOOD PRESSURE: 110 MMHG | HEART RATE: 84 BPM | TEMPERATURE: 98 F | WEIGHT: 154 LBS | DIASTOLIC BLOOD PRESSURE: 70 MMHG | OXYGEN SATURATION: 97 %

## 2021-05-03 DIAGNOSIS — J12.82 PNEUMONIA DUE TO COVID-19 VIRUS: Primary | ICD-10-CM

## 2021-05-03 DIAGNOSIS — U07.1 PNEUMONIA DUE TO COVID-19 VIRUS: Primary | ICD-10-CM

## 2021-05-03 DIAGNOSIS — R09.02 HYPOXIA: ICD-10-CM

## 2021-05-03 PROBLEM — H40.1132 PRIMARY OPEN ANGLE GLAUCOMA OF BOTH EYES, MODERATE STAGE: Status: ACTIVE | Noted: 2018-01-16

## 2021-05-03 PROCEDURE — 3074F SYST BP LT 130 MM HG: CPT | Performed by: FAMILY MEDICINE

## 2021-05-03 PROCEDURE — 3008F BODY MASS INDEX DOCD: CPT | Performed by: FAMILY MEDICINE

## 2021-05-03 PROCEDURE — 3078F DIAST BP <80 MM HG: CPT | Performed by: FAMILY MEDICINE

## 2021-05-03 PROCEDURE — 99204 OFFICE O/P NEW MOD 45 MIN: CPT | Performed by: FAMILY MEDICINE

## 2021-05-03 NOTE — PROGRESS NOTES
HPI:    Haven Wilder is a 56year old female here today for hospital follow up.    She was discharged from {Discharged from which location:7092} to {Discharged to which location:7093::\"Home\"}   Admit Date: ***  Discharge Date: ACCESS [facility] MCG Vaginal Tab, Place 10 mcg vaginally twice a week. brimonidine Tartrate 0.2 % Ophthalmic Solution, Place 1 drop into both eyes 2 (two) times a day. latanoprost 0.005 % Ophthalmic Solution, Place 1 drop into both eyes nightly.       No current facilit bruising, denies bleeding  ENDOCRINE: denies thyroid history  ALL/ASTHMA: denies hx of allergy or asthma    PHYSICAL EXAM:   Patient's last menstrual period was 01/06/2008.   Estimated body mass index is 30.08 kg/m² as calculated from the following:    Heig Risk:   {If the Risk Level is Low or if over 14 days Post Discharge use the Standard E&M LOS Coding, Not TCM.  Elements for Each Level of Medical Decision Making Type of Decision Making__ 2/3 High and seen within 7 days equals High 27518 whereas 2/3 Gilmar Nazario

## 2021-05-03 NOTE — PROGRESS NOTES
Chief Complaint:  Patient presents with:  Establish Care: [facility] F/U: On 04- D/c 04- due to Pneumonia and Covid-19       HPI:  This is a 56year old female patient presenting for Establish Care (New Pt) and Hospital F/U (On 04-10-20 • OTHER DISEASES      R Breast DCIS   • Seizure disorder [facility])     last seizure    • Visual impairment     GLASSES      Past Surgical History:   Procedure Laterality Date   •       times 1   • CHOLECYSTECTOMY  12 [facility] Edw   • COLONOSCO • latanoprost 0.005 % Ophthalmic Solution Place 1 drop into both eyes nightly.          Allergies:  No Known Allergies    EXAM:   05/03/21  1045   BP: 110/70   Pulse: 84   Resp: 14   Temp: 98.2 °F (36.8 °C)   TempSrc: Temporal   SpO2: 97%   Weight: 154 lb

## 2021-05-08 ENCOUNTER — TELEPHONE (OUTPATIENT)
Dept: FAMILY MEDICINE CLINIC | Facility: CLINIC | Age: 61
End: 2021-05-08

## 2021-05-08 DIAGNOSIS — J12.82 PNEUMONIA DUE TO 2019-NCOV: Primary | ICD-10-CM

## 2021-05-08 DIAGNOSIS — R09.02 HYPOXEMIA: ICD-10-CM

## 2021-05-08 DIAGNOSIS — U07.1 PNEUMONIA DUE TO 2019-NCOV: Primary | ICD-10-CM

## 2021-05-08 NOTE — TELEPHONE ENCOUNTER
Patient requesting orders entered 05/03/21 be re-sent to [facility], please call patient once switch has been made

## 2021-05-11 NOTE — TELEPHONE ENCOUNTER
Spoke to pt and she had her labs done. Disregard message.     Also explained to pt that [place] prefers [address].

## 2021-12-16 NOTE — PATIENT INSTRUCTIONS
Discharge Instructions:    Future Appointments   Date Time Provider Marlyn Zelaya   5/3/2021 11:00 AM Kathy Somers, DO [facility]     · See attached return to work note Consent: The patient's consent was obtained including but not limited to risks of crusting, scabbing, blistering, scarring, darker or lighter pigmentary change, recurrence, incomplete removal and infection. Post-Care Instructions: I reviewed with the patient in detail post-care instructions. Patient is to wear sunprotection, and avoid picking at any of the treated lesions. Pt may apply Vaseline to crusted or scabbing areas. Show Applicator Variable?: Yes Add 52 Modifier (Optional): no Medical Necessity Information: It is in your best interest to select a reason for this procedure from the list below. All of these items fulfill various CMS LCD requirements except the new and changing color options. Detail Level: Detailed Medical Necessity Clause: This procedure was medically necessary because the lesions that were treated were:

## 2022-04-21 ENCOUNTER — TELEPHONE (OUTPATIENT)
Dept: FAMILY MEDICINE CLINIC | Facility: CLINIC | Age: 62
End: 2022-04-21

## 2022-04-21 NOTE — TELEPHONE ENCOUNTER
Patient needs to be seen for an annual physical.  Please assist w/ scheduling this appointment. Routed to front staff.

## 2023-07-06 ENCOUNTER — OFFICE VISIT (OUTPATIENT)
Dept: FAMILY MEDICINE CLINIC | Facility: CLINIC | Age: 63
End: 2023-07-06
Payer: COMMERCIAL

## 2023-07-06 VITALS
TEMPERATURE: 97 F | DIASTOLIC BLOOD PRESSURE: 72 MMHG | OXYGEN SATURATION: 97 % | HEIGHT: 61 IN | BODY MASS INDEX: 29.45 KG/M2 | HEART RATE: 72 BPM | SYSTOLIC BLOOD PRESSURE: 140 MMHG | RESPIRATION RATE: 16 BRPM | WEIGHT: 156 LBS

## 2023-07-06 DIAGNOSIS — Z00.00 WELL WOMAN EXAM WITHOUT GYNECOLOGICAL EXAM: Primary | ICD-10-CM

## 2023-07-06 DIAGNOSIS — R00.1 SYMPTOMATIC BRADYCARDIA: ICD-10-CM

## 2023-07-06 DIAGNOSIS — Z12.31 VISIT FOR SCREENING MAMMOGRAM: ICD-10-CM

## 2023-07-06 LAB
ATRIAL RATE: 66 BPM
P AXIS: 48 DEGREES
P-R INTERVAL: 130 MS
Q-T INTERVAL: 458 MS
QRS DURATION: 136 MS
QTC CALCULATION (BEZET): 480 MS
R AXIS: 80 DEGREES
T AXIS: 63 DEGREES
VENTRICULAR RATE: 66 BPM

## 2023-07-06 PROCEDURE — 3008F BODY MASS INDEX DOCD: CPT | Performed by: FAMILY MEDICINE

## 2023-07-06 PROCEDURE — 3078F DIAST BP <80 MM HG: CPT | Performed by: FAMILY MEDICINE

## 2023-07-06 PROCEDURE — 99396 PREV VISIT EST AGE 40-64: CPT | Performed by: FAMILY MEDICINE

## 2023-07-06 PROCEDURE — 3077F SYST BP >= 140 MM HG: CPT | Performed by: FAMILY MEDICINE

## 2023-07-06 PROCEDURE — 93000 ELECTROCARDIOGRAM COMPLETE: CPT | Performed by: FAMILY MEDICINE

## 2023-07-06 PROCEDURE — 99213 OFFICE O/P EST LOW 20 MIN: CPT | Performed by: FAMILY MEDICINE

## 2023-07-18 RX ORDER — TIMOLOL MALEATE 5 MG/ML
1 SOLUTION/ DROPS OPHTHALMIC DAILY
COMMUNITY
End: 2023-09-13

## 2023-08-16 ENCOUNTER — TELEPHONE (OUTPATIENT)
Dept: FAMILY MEDICINE CLINIC | Facility: CLINIC | Age: 63
End: 2023-08-16

## 2023-08-16 DIAGNOSIS — N63.22 MASS OF UPPER INNER QUADRANT OF LEFT BREAST: Primary | ICD-10-CM

## 2023-08-16 NOTE — TELEPHONE ENCOUNTER
Received cardiac over read from [facility] form [facility]  Notes well circumscribed mass in the L upper mid breast measuring 1.2x1.1 cm with associated calcifications. With her hx, I recommend we get a diagnostic mammogram asap.     Meseret Ferro, DO

## 2023-09-07 ENCOUNTER — HOSPITAL ENCOUNTER (OUTPATIENT)
Dept: MAMMOGRAPHY | Facility: HOSPITAL | Age: 63
Discharge: HOME | End: 2023-09-07
Attending: FAMILY MEDICINE
Payer: COMMERCIAL

## 2023-09-07 DIAGNOSIS — N63.22 MASS OF UPPER INNER QUADRANT OF LEFT BREAST: ICD-10-CM

## 2023-09-07 PROCEDURE — 77062 BREAST TOMOSYNTHESIS BI: CPT | Performed by: FAMILY MEDICINE

## 2023-09-07 PROCEDURE — 77066 DX MAMMO INCL CAD BI: CPT | Performed by: FAMILY MEDICINE

## 2023-09-13 DIAGNOSIS — R92.2 DENSE BREAST TISSUE ON MAMMOGRAM: Primary | ICD-10-CM

## 2023-09-18 NOTE — PAT NURSING NOTE
Preprocedure Instructions     Pre-Procedure Instructions: Encouraged to check in electronically via [employer]     Visitor Instructions     Adult Patients: 2 Adult Care Partners can accompany the patient day of procedure. 2 Care Partners may visit [phone] during inpatient stay     PreOp Instructions     You are scheduled for: a Cardiac Procedure     Date of Procedure: 09/19/23     Diet Instructions: Do not eat or drink anything after midnight     Medications: Medications you are allowed to take can be taken with a sip of water the morning of your procedure     Medications to Stop: Hold herbal supplements and vitamins on day of procedure    Sleep Apnea: If you have sleep apnea, please bring your mask and tubing     Skin Prep: Shower with antibacterial soap using a clean washcloth, prior to procedure     Arrival Time: You will receive a call the afternoon before your procedure between 2 pm to 5 pm on what time you should arrive the day of your procedure    Driving After Procedure: If sedation is given, you WILL NOT be able to drive home. You will need a responsible adult  to drive you home. ;Cannot take uber or cab unless approved by physician     Discharge Teaching: Your nurse will give you specific instructions before discharge; Most people can resume normal activities in 2-3 days; Any questions, please call the physician's office     Park in the [place] parking garage at [address] in at the [facility] reception desk. Our  will be there to check you in for your procedure. Please bring your insurance cards and ID with you.  parking is available starting at 6 am.                                                                                                                                         Please DO NOT respond to this message, the inbasket is not monitored for messages. For any questions, please call the physician's office.

## 2023-09-19 ENCOUNTER — APPOINTMENT (OUTPATIENT)
Dept: GENERAL RADIOLOGY | Facility: HOSPITAL | Age: 63
End: 2023-09-19
Attending: INTERNAL MEDICINE
Payer: COMMERCIAL

## 2023-09-19 ENCOUNTER — HOSPITAL ENCOUNTER (OUTPATIENT)
Dept: INTERVENTIONAL RADIOLOGY/VASCULAR | Facility: HOSPITAL | Age: 63
Discharge: HOME | End: 2023-09-19
Attending: INTERNAL MEDICINE | Admitting: INTERNAL MEDICINE
Payer: COMMERCIAL

## 2023-09-19 VITALS
DIASTOLIC BLOOD PRESSURE: 70 MMHG | BODY MASS INDEX: 28.32 KG/M2 | HEART RATE: 60 BPM | TEMPERATURE: 98 F | OXYGEN SATURATION: 98 % | WEIGHT: 150 LBS | SYSTOLIC BLOOD PRESSURE: 153 MMHG | HEIGHT: 61 IN | RESPIRATION RATE: 14 BRPM

## 2023-09-19 DIAGNOSIS — I44.30 AVB (ATRIOVENTRICULAR BLOCK): ICD-10-CM

## 2023-09-19 LAB
ATRIAL RATE: 60 BPM
P AXIS: 48 DEGREES
P-R INTERVAL: 166 MS
Q-T INTERVAL: 494 MS
QRS DURATION: 138 MS
QTC CALCULATION (BEZET): 494 MS
R AXIS: 75 DEGREES
T AXIS: 30 DEGREES
VENTRICULAR RATE: 60 BPM

## 2023-09-19 PROCEDURE — 99152 MOD SED SAME PHYS/QHP 5/>YRS: CPT | Performed by: INTERNAL MEDICINE

## 2023-09-19 PROCEDURE — 71045 X-RAY EXAM CHEST 1 VIEW: CPT | Performed by: INTERNAL MEDICINE

## 2023-09-19 PROCEDURE — 0JH606Z INSERTION OF PACEMAKER, DUAL CHAMBER INTO CHEST SUBCUTANEOUS TISSUE AND FASCIA, OPEN APPROACH: ICD-10-PCS | Performed by: INTERNAL MEDICINE

## 2023-09-19 PROCEDURE — 99153 MOD SED SAME PHYS/QHP EA: CPT | Performed by: INTERNAL MEDICINE

## 2023-09-19 PROCEDURE — 3E0132A INTRODUCTION OF ANTI-INFECTIVE ENVELOPE INTO SUBCUTANEOUS TISSUE, PERCUTANEOUS APPROACH: ICD-10-PCS | Performed by: INTERNAL MEDICINE

## 2023-09-19 PROCEDURE — 93005 ELECTROCARDIOGRAM TRACING: CPT

## 2023-09-19 PROCEDURE — 93010 ELECTROCARDIOGRAM REPORT: CPT | Performed by: INTERNAL MEDICINE

## 2023-09-19 PROCEDURE — 33208 INSRT HEART PM ATRIAL & VENT: CPT | Performed by: INTERNAL MEDICINE

## 2023-09-19 PROCEDURE — 02HK3JZ INSERTION OF PACEMAKER LEAD INTO RIGHT VENTRICLE, PERCUTANEOUS APPROACH: ICD-10-PCS | Performed by: INTERNAL MEDICINE

## 2023-09-19 PROCEDURE — 02H63JZ INSERTION OF PACEMAKER LEAD INTO RIGHT ATRIUM, PERCUTANEOUS APPROACH: ICD-10-PCS | Performed by: INTERNAL MEDICINE

## 2023-09-19 RX ORDER — CEFAZOLIN SODIUM/WATER 2 G/20 ML
2 SYRINGE (ML) INTRAVENOUS
Status: DISCONTINUED | OUTPATIENT
Start: 2023-09-19 | End: 2023-09-19 | Stop reason: HOSPADM

## 2023-09-19 RX ORDER — CEFAZOLIN SODIUM/WATER 2 G/20 ML
2 SYRINGE (ML) INTRAVENOUS EVERY 8 HOURS
Status: DISCONTINUED | OUTPATIENT
Start: 2023-09-19 | End: 2023-09-19

## 2023-09-19 RX ORDER — ACETAMINOPHEN 500 MG
TABLET ORAL
Status: COMPLETED
Start: 2023-09-19 | End: 2023-09-19

## 2023-09-19 RX ORDER — ONDANSETRON 2 MG/ML
4 INJECTION INTRAMUSCULAR; INTRAVENOUS EVERY 6 HOURS PRN
Status: DISCONTINUED | OUTPATIENT
Start: 2023-09-19 | End: 2023-09-19

## 2023-09-19 RX ORDER — LIDOCAINE HYDROCHLORIDE 10 MG/ML
INJECTION, SOLUTION EPIDURAL; INFILTRATION; INTRACAUDAL; PERINEURAL
Status: COMPLETED
Start: 2023-09-19 | End: 2023-09-19

## 2023-09-19 RX ORDER — HEPARIN SODIUM 1000 [USP'U]/ML
INJECTION, SOLUTION INTRAVENOUS; SUBCUTANEOUS
Status: COMPLETED
Start: 2023-09-19 | End: 2023-09-19

## 2023-09-19 RX ORDER — VANCOMYCIN HYDROCHLORIDE 1 G/20ML
INJECTION, POWDER, LYOPHILIZED, FOR SOLUTION INTRAVENOUS
Status: COMPLETED
Start: 2023-09-19 | End: 2023-09-19

## 2023-09-19 RX ORDER — SODIUM CHLORIDE 9 MG/ML
INJECTION, SOLUTION INTRAVENOUS
Status: DISCONTINUED | OUTPATIENT
Start: 2023-09-20 | End: 2023-09-19 | Stop reason: HOSPADM

## 2023-09-19 RX ORDER — CEFAZOLIN SODIUM/WATER 2 G/20 ML
SYRINGE (ML) INTRAVENOUS
Status: COMPLETED
Start: 2023-09-19 | End: 2023-09-19

## 2023-09-19 RX ORDER — CHLORHEXIDINE GLUCONATE 4 G/100ML
30 SOLUTION TOPICAL
Status: DISCONTINUED | OUTPATIENT
Start: 2023-09-19 | End: 2023-09-19 | Stop reason: HOSPADM

## 2023-09-19 RX ORDER — MIDAZOLAM HYDROCHLORIDE 1 MG/ML
INJECTION INTRAMUSCULAR; INTRAVENOUS
Status: COMPLETED
Start: 2023-09-19 | End: 2023-09-19

## 2023-09-19 RX ADMIN — CEFAZOLIN SODIUM/WATER 2 G: 2 G/20 ML SYRINGE (ML) INTRAVENOUS at 13:00:00

## 2023-09-19 RX ADMIN — ACETAMINOPHEN 1000 MG: 500 MG TABLET ORAL at 11:30:00

## 2023-09-19 NOTE — IVS NOTE
Patient discharged home post PPM insertion. VSS during recovery. CXR WNL. EKG completed. [facility] provided education to patient and spouse. Dr. Gabbi Mendieta spoke to patients family post procedure. PO intake tolerated. 2 G ancef given prior to discharge. AVS reviewed. PIV removed. Discharged via wheelchair with all belongings.

## 2023-09-19 NOTE — DISCHARGE INSTRUCTIONS
Pacemaker and Defibrillator Discharge Instructions    Activity  Plan to rest tonight and tomorrow. Avoid drinking alcohol for next 24 hours. Do not drive after procedure until 1-week device check appointment, unless otherwise instructed by your cardiologist.   Wear sling for next 24 hours, then only at night as needed for 30 days to help assist with arm restrictions while sleeping. Arm Restrictions: For 30 days after implant:  do not lift arm with implanted device above your head or behind your back. Arm is to remain below your shoulder and in front of your body. do not lift more than 10 lbs with affected arm   do not perform repetitive cycling motion with affected arm (swimming, golfing, bowling, tennis, exercise machines, raking, vacuuming, snow shoveling). Incision Care/Bathing  Keep incision site completely dry for 5 days after surgery. After day 5, you can remove top dressing and shower, letting clean soapy water run over incision area, patting dry. The white steri-strips placed directly over the incision will gradually fall off over the next few weeks and can be physically removed after 3 weeks. Do not take them off before this time. (If you have a zipline dressing, this will be removed by device nurse or MD in 4 weeks)   Keep procedure site clean and dry, do not apply any ointments, creams, lotions to the incision. Look at your incision daily to monitor for signs and symptoms of infection (redness, swelling, drainage, foul odor from procedure site)  Do not cover incision with extra dressings or gauze unless otherwise instructed. Do not submerge incision in water, take whirlpool baths or swim for 30 days or until site is completely healed.      When to notify your physician:   If you have chest pain, shortness of breath or persistent cough  If you experience any signs of fever (temperature >101), chills, infection (redness, swelling, thick yellow drainage, foul order from procedure site)  New or worsening swelling of arm with implanted device   If an ICD was inserted and you receive a shock and have no symptoms, call [facility] device clinic. If you have symptoms (fainting, near fainting, dizziness, lightheadedness, chest pain, shortness of breath, palpitations), call 911.

## 2023-09-19 NOTE — PROCEDURES
Pacemaker Implantation      History:  57year old female with symptomatic 2nd degree AVB type 2 who presents for a dual chamber pacemaker implant. The risks (including, but not limited to, hematoma, infection, pneumothorax, tamponade, renal failure from IV dye, damage to any existing leads, myocardial infarction, stroke, and death), benefits (prevention of bradycardia), and alternatives (no device) of the procedure were discussed. The patient understands and agrees to proceed. Procedure: The patient was brought to the electrophysiology laboratory in a fasting and nonsedated state. The left chest was prepped and draped in the usual sterile fashion. Ancef was given for antibiotic prophylaxis. 200mcg of Fentanyl and 8mg of Versed were administered by certified nursing staff and supervised directly by me from 8:12 to 9:30. 1% lidocaine was used for skin anesthesia. A left upper extremity venogram was performed by the administration of 10cc of IV dye into the arm. The cephalic, axillary, and subclavian veins were patent. An incision was made below the left clavicle with a scalpel and a pocket was created using sharp and blunt dissection. Access to the left subclavian vein was obtained two times using a micropuncture needle via the extrathoracic approach guided by the venogram. The RV lead was placed through a steerable sheath and was used to map the RV septum for a location where paced complexes had a notch on the downstroke in V1, discordant aVR and AVL, and lead 2 more positive than lead 3. This was generally about 1.5cm from the His in the HATCH position. At a suitable location, the lead was deployed into the deep septum. Impedance, QRS morphology, and stim to peak QRS in V5 was used to monitor a final location, with the stim to peak QRS of 68ms. The RA lead was placed in the RAA region. Lead data was evaluated as below.  Pacing was performed at 10V on the leads to ensure no direct diaphragmatic or phrenic nerve stimulation. The leads were secured to the underlying fascia with 0-Ethibond suture. The pocket was irrigated with antibiotic solution. The leads were connected to the new pulse generator in an antibiotic pouch and placed in the pocket. The pocket was closed in layers with 2-0 Vicryl for the deep fascia and 4-0 Vicryl for the skin. The wound was dressed and the left arm was placed in a sling.     Device data:           Model Number Serial Number Sensing(mV) Impedance Pacing   Generator Nader Hurd [id number]      RA St Case 2825OH/05 [id number] 3.6 600 0.75V @ 0.4ms   RV St Case [id number] 5.9 690 0.75V @ 0.4ms       Conclusions:  Successful dual chamber pacemaker implant with left bundle area pacing lead  Adequate RA and RV sensing and pacing thresholds      Steve Wilkes MD  Clinical Cardiac Electrophysiology  [facility]

## 2023-10-12 ENCOUNTER — ANESTHESIA EVENT (OUTPATIENT)
Dept: SURGERY | Facility: HOSPITAL | Age: 63
DRG: 330 | End: 2023-10-12
Payer: COMMERCIAL

## 2023-10-23 ENCOUNTER — HOSPITAL ENCOUNTER (INPATIENT)
Facility: HOSPITAL | Age: 63
LOS: 8 days | Discharge: HOME | DRG: 330 | End: 2023-10-31
Attending: SURGERY | Admitting: SURGERY
Payer: COMMERCIAL

## 2023-10-23 ENCOUNTER — ANESTHESIA (OUTPATIENT)
Dept: SURGERY | Facility: HOSPITAL | Age: 63
DRG: 330 | End: 2023-10-23
Payer: COMMERCIAL

## 2023-10-23 DIAGNOSIS — K86.2 PANCREAS CYST: ICD-10-CM

## 2023-10-23 DIAGNOSIS — K57.90 DIVERTICULOSIS: Primary | ICD-10-CM

## 2023-10-23 DIAGNOSIS — Z01.818 PRE-OP TESTING: ICD-10-CM

## 2023-10-23 LAB — GLUCOSE BLD-MCNC: 159 MG/DL (ref 70–99)

## 2023-10-23 PROCEDURE — 0DBN0ZZ EXCISION OF SIGMOID COLON, OPEN APPROACH: ICD-10-PCS | Performed by: SURGERY

## 2023-10-23 PROCEDURE — 76942 ECHO GUIDE FOR BIOPSY: CPT | Performed by: ANESTHESIOLOGY

## 2023-10-23 PROCEDURE — 0DNW4ZZ RELEASE PERITONEUM, PERCUTANEOUS ENDOSCOPIC APPROACH: ICD-10-PCS | Performed by: SURGERY

## 2023-10-23 PROCEDURE — 8E0W4CZ ROBOTIC ASSISTED PROCEDURE OF TRUNK REGION, PERCUTANEOUS ENDOSCOPIC APPROACH: ICD-10-PCS | Performed by: SURGERY

## 2023-10-23 PROCEDURE — 0DTJ0ZZ RESECTION OF APPENDIX, OPEN APPROACH: ICD-10-PCS | Performed by: SURGERY

## 2023-10-23 PROCEDURE — 3E0T3BZ INTRODUCTION OF ANESTHETIC AGENT INTO PERIPHERAL NERVES AND PLEXI, PERCUTANEOUS APPROACH: ICD-10-PCS | Performed by: ANESTHESIOLOGY

## 2023-10-23 PROCEDURE — 0DJD4ZZ INSPECTION OF LOWER INTESTINAL TRACT, PERCUTANEOUS ENDOSCOPIC APPROACH: ICD-10-PCS | Performed by: SURGERY

## 2023-10-23 PROCEDURE — 99222 1ST HOSP IP/OBS MODERATE 55: CPT | Performed by: INTERNAL MEDICINE

## 2023-10-23 PROCEDURE — 0DJD8ZZ INSPECTION OF LOWER INTESTINAL TRACT, VIA NATURAL OR ARTIFICIAL OPENING ENDOSCOPIC: ICD-10-PCS | Performed by: SURGERY

## 2023-10-23 RX ORDER — BUPIVACAINE HYDROCHLORIDE AND EPINEPHRINE 5; 5 MG/ML; UG/ML
INJECTION, SOLUTION EPIDURAL; INTRACAUDAL; PERINEURAL AS NEEDED
Status: DISCONTINUED | OUTPATIENT
Start: 2023-10-23 | End: 2023-10-23 | Stop reason: HOSPADM

## 2023-10-23 RX ORDER — HYDROMORPHONE HYDROCHLORIDE 1 MG/ML
0.2 INJECTION, SOLUTION INTRAMUSCULAR; INTRAVENOUS; SUBCUTANEOUS EVERY 5 MIN PRN
Status: DISCONTINUED | OUTPATIENT
Start: 2023-10-23 | End: 2023-10-23 | Stop reason: HOSPADM

## 2023-10-23 RX ORDER — ONDANSETRON 2 MG/ML
4 INJECTION INTRAMUSCULAR; INTRAVENOUS EVERY 6 HOURS PRN
Status: DISCONTINUED | OUTPATIENT
Start: 2023-10-23 | End: 2023-10-23 | Stop reason: HOSPADM

## 2023-10-23 RX ORDER — LATANOPROST 50 UG/ML
1 SOLUTION/ DROPS OPHTHALMIC NIGHTLY
Status: DISCONTINUED | OUTPATIENT
Start: 2023-10-23 | End: 2023-10-31

## 2023-10-23 RX ORDER — FAMOTIDINE 10 MG/ML
20 INJECTION, SOLUTION INTRAVENOUS 2 TIMES DAILY
Status: DISCONTINUED | OUTPATIENT
Start: 2023-10-23 | End: 2023-10-30 | Stop reason: ALTCHOICE

## 2023-10-23 RX ORDER — PROCHLORPERAZINE EDISYLATE 5 MG/ML
5 INJECTION INTRAMUSCULAR; INTRAVENOUS EVERY 8 HOURS PRN
Status: DISCONTINUED | OUTPATIENT
Start: 2023-10-23 | End: 2023-10-23 | Stop reason: HOSPADM

## 2023-10-23 RX ORDER — GLYCOPYRROLATE 0.2 MG/ML
INJECTION, SOLUTION INTRAMUSCULAR; INTRAVENOUS AS NEEDED
Status: DISCONTINUED | OUTPATIENT
Start: 2023-10-23 | End: 2023-10-23 | Stop reason: SURG

## 2023-10-23 RX ORDER — LIDOCAINE HYDROCHLORIDE 10 MG/ML
INJECTION, SOLUTION EPIDURAL; INFILTRATION; INTRACAUDAL; PERINEURAL AS NEEDED
Status: DISCONTINUED | OUTPATIENT
Start: 2023-10-23 | End: 2023-10-23 | Stop reason: SURG

## 2023-10-23 RX ORDER — METRONIDAZOLE 500 MG/100ML
500 INJECTION, SOLUTION INTRAVENOUS ONCE
Status: COMPLETED | OUTPATIENT
Start: 2023-10-23 | End: 2023-10-23

## 2023-10-23 RX ORDER — SENNOSIDES 8.6 MG
17.2 TABLET ORAL NIGHTLY PRN
Status: DISCONTINUED | OUTPATIENT
Start: 2023-10-23 | End: 2023-10-31

## 2023-10-23 RX ORDER — LAMOTRIGINE 100 MG/1
100 TABLET ORAL DAILY
Status: DISCONTINUED | OUTPATIENT
Start: 2023-10-24 | End: 2023-10-31

## 2023-10-23 RX ORDER — HYDROMORPHONE HYDROCHLORIDE 1 MG/ML
0.8 INJECTION, SOLUTION INTRAMUSCULAR; INTRAVENOUS; SUBCUTANEOUS EVERY 2 HOUR PRN
Status: DISCONTINUED | OUTPATIENT
Start: 2023-10-23 | End: 2023-10-31

## 2023-10-23 RX ORDER — SODIUM CHLORIDE, SODIUM LACTATE, POTASSIUM CHLORIDE, CALCIUM CHLORIDE 600; 310; 30; 20 MG/100ML; MG/100ML; MG/100ML; MG/100ML
INJECTION, SOLUTION INTRAVENOUS CONTINUOUS PRN
Status: DISCONTINUED | OUTPATIENT
Start: 2023-10-23 | End: 2023-10-23 | Stop reason: SURG

## 2023-10-23 RX ORDER — HEPARIN SODIUM 5000 [USP'U]/ML
5000 INJECTION, SOLUTION INTRAVENOUS; SUBCUTANEOUS ONCE
Status: COMPLETED | OUTPATIENT
Start: 2023-10-23 | End: 2023-10-23

## 2023-10-23 RX ORDER — HYDROMORPHONE HYDROCHLORIDE 1 MG/ML
0.4 INJECTION, SOLUTION INTRAMUSCULAR; INTRAVENOUS; SUBCUTANEOUS EVERY 2 HOUR PRN
Status: DISCONTINUED | OUTPATIENT
Start: 2023-10-23 | End: 2023-10-31

## 2023-10-23 RX ORDER — ROCURONIUM BROMIDE 10 MG/ML
INJECTION, SOLUTION INTRAVENOUS AS NEEDED
Status: DISCONTINUED | OUTPATIENT
Start: 2023-10-23 | End: 2023-10-23 | Stop reason: SURG

## 2023-10-23 RX ORDER — SODIUM CHLORIDE 9 MG/ML
INJECTION, SOLUTION INTRAVENOUS CONTINUOUS
Status: DISCONTINUED | OUTPATIENT
Start: 2023-10-23 | End: 2023-10-23

## 2023-10-23 RX ORDER — SODIUM CHLORIDE, SODIUM LACTATE, POTASSIUM CHLORIDE, CALCIUM CHLORIDE 600; 310; 30; 20 MG/100ML; MG/100ML; MG/100ML; MG/100ML
1.5 INJECTION, SOLUTION INTRAVENOUS CONTINUOUS
Status: DISCONTINUED | OUTPATIENT
Start: 2023-10-23 | End: 2023-10-31

## 2023-10-23 RX ORDER — SODIUM CHLORIDE, SODIUM LACTATE, POTASSIUM CHLORIDE, CALCIUM CHLORIDE 600; 310; 30; 20 MG/100ML; MG/100ML; MG/100ML; MG/100ML
INJECTION, SOLUTION INTRAVENOUS CONTINUOUS
Status: DISCONTINUED | OUTPATIENT
Start: 2023-10-23 | End: 2023-10-23 | Stop reason: HOSPADM

## 2023-10-23 RX ORDER — HEPARIN SODIUM 5000 [USP'U]/ML
5000 INJECTION, SOLUTION INTRAVENOUS; SUBCUTANEOUS EVERY 8 HOURS SCHEDULED
Status: DISCONTINUED | OUTPATIENT
Start: 2023-10-24 | End: 2023-10-31

## 2023-10-23 RX ORDER — ACETAMINOPHEN 500 MG
1000 TABLET ORAL ONCE
Status: DISCONTINUED | OUTPATIENT
Start: 2023-10-23 | End: 2023-10-23 | Stop reason: HOSPADM

## 2023-10-23 RX ORDER — HYDROMORPHONE HYDROCHLORIDE 1 MG/ML
0.6 INJECTION, SOLUTION INTRAMUSCULAR; INTRAVENOUS; SUBCUTANEOUS EVERY 5 MIN PRN
Status: DISCONTINUED | OUTPATIENT
Start: 2023-10-23 | End: 2023-10-23 | Stop reason: HOSPADM

## 2023-10-23 RX ORDER — HYDROCODONE BITARTRATE AND ACETAMINOPHEN 5; 325 MG/1; MG/1
1 TABLET ORAL ONCE AS NEEDED
Status: DISCONTINUED | OUTPATIENT
Start: 2023-10-23 | End: 2023-10-23 | Stop reason: HOSPADM

## 2023-10-23 RX ORDER — FAMOTIDINE 20 MG/1
20 TABLET, FILM COATED ORAL 2 TIMES DAILY
Status: DISCONTINUED | OUTPATIENT
Start: 2023-10-23 | End: 2023-10-30 | Stop reason: ALTCHOICE

## 2023-10-23 RX ORDER — MIDAZOLAM HYDROCHLORIDE 1 MG/ML
INJECTION INTRAMUSCULAR; INTRAVENOUS AS NEEDED
Status: DISCONTINUED | OUTPATIENT
Start: 2023-10-23 | End: 2023-10-23 | Stop reason: SURG

## 2023-10-23 RX ORDER — DEXAMETHASONE SODIUM PHOSPHATE 4 MG/ML
VIAL (ML) INJECTION AS NEEDED
Status: DISCONTINUED | OUTPATIENT
Start: 2023-10-23 | End: 2023-10-23 | Stop reason: SURG

## 2023-10-23 RX ORDER — NALOXONE HYDROCHLORIDE 0.4 MG/ML
0.08 INJECTION, SOLUTION INTRAMUSCULAR; INTRAVENOUS; SUBCUTANEOUS AS NEEDED
Status: DISCONTINUED | OUTPATIENT
Start: 2023-10-23 | End: 2023-10-23 | Stop reason: HOSPADM

## 2023-10-23 RX ORDER — HYDROMORPHONE HYDROCHLORIDE 1 MG/ML
0.4 INJECTION, SOLUTION INTRAMUSCULAR; INTRAVENOUS; SUBCUTANEOUS EVERY 5 MIN PRN
Status: DISCONTINUED | OUTPATIENT
Start: 2023-10-23 | End: 2023-10-23 | Stop reason: HOSPADM

## 2023-10-23 RX ORDER — KETAMINE HYDROCHLORIDE 50 MG/ML
INJECTION, SOLUTION, CONCENTRATE INTRAMUSCULAR; INTRAVENOUS AS NEEDED
Status: DISCONTINUED | OUTPATIENT
Start: 2023-10-23 | End: 2023-10-23 | Stop reason: SURG

## 2023-10-23 RX ORDER — OXYCODONE HYDROCHLORIDE 10 MG/1
5 TABLET ORAL EVERY 4 HOURS PRN
Status: DISCONTINUED | OUTPATIENT
Start: 2023-10-23 | End: 2023-10-31

## 2023-10-23 RX ORDER — HYDROMORPHONE HYDROCHLORIDE 1 MG/ML
INJECTION, SOLUTION INTRAMUSCULAR; INTRAVENOUS; SUBCUTANEOUS
Status: COMPLETED
Start: 2023-10-23 | End: 2023-10-23

## 2023-10-23 RX ORDER — ACETAMINOPHEN 500 MG
1000 TABLET ORAL ONCE AS NEEDED
Status: DISCONTINUED | OUTPATIENT
Start: 2023-10-23 | End: 2023-10-23 | Stop reason: HOSPADM

## 2023-10-23 RX ORDER — ONDANSETRON 2 MG/ML
INJECTION INTRAMUSCULAR; INTRAVENOUS AS NEEDED
Status: DISCONTINUED | OUTPATIENT
Start: 2023-10-23 | End: 2023-10-23 | Stop reason: SURG

## 2023-10-23 RX ORDER — PROCHLORPERAZINE EDISYLATE 5 MG/ML
5 INJECTION INTRAMUSCULAR; INTRAVENOUS EVERY 8 HOURS PRN
Status: DISCONTINUED | OUTPATIENT
Start: 2023-10-23 | End: 2023-10-27

## 2023-10-23 RX ORDER — OXYCODONE HYDROCHLORIDE 10 MG/1
10 TABLET ORAL EVERY 4 HOURS PRN
Status: DISCONTINUED | OUTPATIENT
Start: 2023-10-23 | End: 2023-10-31

## 2023-10-23 RX ORDER — NEOSTIGMINE METHYLSULFATE 1 MG/ML
INJECTION, SOLUTION INTRAVENOUS AS NEEDED
Status: DISCONTINUED | OUTPATIENT
Start: 2023-10-23 | End: 2023-10-23 | Stop reason: SURG

## 2023-10-23 RX ORDER — ONDANSETRON 2 MG/ML
4 INJECTION INTRAMUSCULAR; INTRAVENOUS EVERY 6 HOURS PRN
Status: DISCONTINUED | OUTPATIENT
Start: 2023-10-23 | End: 2023-10-31

## 2023-10-23 RX ORDER — HYDROCODONE BITARTRATE AND ACETAMINOPHEN 5; 325 MG/1; MG/1
2 TABLET ORAL ONCE AS NEEDED
Status: DISCONTINUED | OUTPATIENT
Start: 2023-10-23 | End: 2023-10-23 | Stop reason: HOSPADM

## 2023-10-23 RX ORDER — POLYETHYLENE GLYCOL 3350 17 G/17G
17 POWDER, FOR SOLUTION ORAL DAILY PRN
Status: DISCONTINUED | OUTPATIENT
Start: 2023-10-23 | End: 2023-10-31

## 2023-10-23 RX ADMIN — LIDOCAINE HYDROCHLORIDE 25 MG: 10 INJECTION, SOLUTION EPIDURAL; INFILTRATION; INTRACAUDAL; PERINEURAL at 07:39:00

## 2023-10-23 RX ADMIN — METRONIDAZOLE 500 MG: 500 INJECTION, SOLUTION INTRAVENOUS at 07:45:00

## 2023-10-23 RX ADMIN — ROCURONIUM BROMIDE 50 MG: 10 INJECTION, SOLUTION INTRAVENOUS at 07:39:00

## 2023-10-23 RX ADMIN — KETAMINE HYDROCHLORIDE 25 MG: 50 INJECTION, SOLUTION, CONCENTRATE INTRAMUSCULAR; INTRAVENOUS at 09:50:00

## 2023-10-23 RX ADMIN — SODIUM CHLORIDE, SODIUM LACTATE, POTASSIUM CHLORIDE, CALCIUM CHLORIDE: 600; 310; 30; 20 INJECTION, SOLUTION INTRAVENOUS at 07:30:00

## 2023-10-23 RX ADMIN — GLYCOPYRROLATE 0.6 MG: 0.2 INJECTION, SOLUTION INTRAMUSCULAR; INTRAVENOUS at 10:17:00

## 2023-10-23 RX ADMIN — DEXAMETHASONE SODIUM PHOSPHATE 8 MG: 4 MG/ML VIAL (ML) INJECTION at 07:46:00

## 2023-10-23 RX ADMIN — NEOSTIGMINE METHYLSULFATE 4 MG: 1 INJECTION, SOLUTION INTRAVENOUS at 10:17:00

## 2023-10-23 RX ADMIN — KETAMINE HYDROCHLORIDE 25 MG: 50 INJECTION, SOLUTION, CONCENTRATE INTRAMUSCULAR; INTRAVENOUS at 07:37:00

## 2023-10-23 RX ADMIN — ONDANSETRON 4 MG: 2 INJECTION INTRAMUSCULAR; INTRAVENOUS at 07:46:00

## 2023-10-23 RX ADMIN — MIDAZOLAM HYDROCHLORIDE 2 MG: 1 INJECTION INTRAMUSCULAR; INTRAVENOUS at 07:37:00

## 2023-10-23 NOTE — ANESTHESIA PROCEDURE NOTES
Regional Block    Performed by: Eugene Heck MD  Authorized by: Eugene Heck MD      General Information and Staff    Start Time:   Anesthesiologist:  Eugene Heck MD  Performed by: Anesthesiologist  Patient Location:  OR      Site Identification: real time ultrasound guided and image stored and retrievable    Block site/laterality marked before start: site marked  Reason for Block: at surgeon's request and post-op pain management    Preanesthetic Checklist: 2 patient identifers, IV checked, risks and benefits discussed, monitors and equipment checked, pre-op evaluation, timeout performed, anesthesia consent, sterile technique used, no prohibitive neurological deficits and no local skin infection at insertion site      Procedure Details    Patient Position:  Supine  Prep: ChloraPrep    Monitoring:  Cardiac monitor, continuous pulse ox and blood pressure cuff  Block Type:  TAP  Laterality:  Bilateral  Injection Technique:  Single-shot    Needle    Needle Type:  Short-bevel and echogenic  Needle Localization:  Ultrasound guidance  Reason for Ultrasound Use: appropriate spread of the medication was noted in real time and no ultrasound evidence of intravascular and/or intraneural injection            Assessment    Injection Assessment:  Good spread noted, negative resistance, negative aspiration for heme, incremental injection, low pressure and local visualized surrounding nerve on ultrasound  Heart Rate Change: No    - Patient tolerated block procedure well without evidence of immediate block related complications.      Medications      Additional Comments    Medication:  Ropivacaine 0.25% 20mL  with 1:200,000 epi bilaterally

## 2023-10-23 NOTE — ANESTHESIA PROCEDURE NOTES
Airway  Date/Time: 10/23/2023 7:40 AM  Urgency: elective      General Information and Staff    Patient location during procedure: OR  Anesthesiologist: Tenisha Price MD  Performed: anesthesiologist   Performed by: Tenisha Price MD  Authorized by: Tenisha Price MD      Indications and Patient Condition  Indications for airway management: anesthesia  Sedation level: deep  Preoxygenated: yes  Patient position: reverse trendelenburg  Mask difficulty assessment: 1 - vent by mask    Final Airway Details  Final airway type: endotracheal airway      Successful airway: ETT  Cuffed: yes   Successful intubation technique: direct laryngoscopy  Facilitating devices/methods: intubating stylet, anterior pressure/BURP and cricoid pressure  Endotracheal tube insertion site: oral  Blade: Malinda  Blade size: #3  ETT size (mm): 7.0    Cormack-Lehane Classification: grade IIB - view of arytenoids or posterior of glottis only  Placement verified by: capnometry   Cuff volume (mL): 5  Measured from: lips  Number of attempts at approach: 1

## 2023-10-23 NOTE — H&P
HPI:    Darrel Lea is a 57year old female who presents for evaluation of redundancy of her sigmoid colon. Patient was evaluated 3 years ago for constipation and difficulty with bowel movements. She underwent an attempted colonoscopy however was unable to be completed secondary to sigmoid redundancy within the pelvis. Barium enema revealed a large amount of redundancy in the pelvis. Patient does have a history of hysterectomy. Patient denies any significant abdominal pain. Her bowel movements are soft. Patient does have to almost lean in a recumbent position to have a bowel movement. Past Medical History:  Diagnosis Date  Breast CA ([facility] [address] '  DCIS  DCIS (ductal carcinoma in situ)  Migraines  Obstructive sleep apnea (adult) (pediatric) 2019  DMG PSG AHI 15 RDI REM AHI 11 SaO2 emiliano 91%  OTHER DISEASES  R Breast DCIS  Seizure disorder ([facility] [address]  last seizure   Visual impairment  GLASSES    Past Surgical History:  Procedure Laterality Date    times 1  CHOLECYSTECTOMY 12 [facility] Edw  COLONOSCOPY  COLONOSCOPY N/A 2020  Procedure: COLONOSCOPY; Surgeon: Hedy Carvalho MD; Location: [address] [place] [address] ENDOSCOPY  HX BREAST CANCER Right   DCIS  HYSTERECTOMY  LUMPECTOMY RIGHT 08 [facility] EDW  LUMPECTOMY RIGHT 08 [facility]  Re-Excision Right Breast Lumpectomy  NEEDLE BIOPSY LEFT   NEEDLE BIOPSY RIGHT ,   ORAL SURGERY PROCEDURE Age 23  STEREOTACTIC BREAST BIOPSY 07 [facility] EDW  Bilateral  STEREOTACTIC BREAST BIOPSY 09 [facility] EDW  Right Breast 10 o'clock posterior  TONSILLECTOMY Age 3    Current Outpatient Medications  Medication Sig Dispense Refill  timolol 0.5 % Ophthalmic Solution Place 1 drop into both eyes daily. OnabotulinumtoxinA (BOTOX) 200 units Injection Recon Soln INJECT 155 UNITS INTRAMUSCULARLY INTO FACE, NECK, AND HEAD EVERY 12 WEEKS (GIVEN AT MD OFFICE, DISCARD UNUSED) 1 each 3  lamoTRIgine 25 MG Oral Tab Take 4 tablets (100 mg total) by mouth daily.  360 tablet 3  Ascorbic Acid (VITAMIN C) 1000 MG Oral Tab Take 1 tablet (1,000 mg total) by mouth daily. 30 tablet 0  Estradiol (VAGIFEM) 10 MCG Vaginal Tab Place 10 mcg vaginally twice a week. 24 tablet 4  brimonidine Tartrate 0.2 % Ophthalmic Solution Place 1 drop into both eyes 2 (two) times a day. latanoprost 0.005 % Ophthalmic Solution Place 1 drop into both eyes nightly. No Known Allergies    Family History  Problem Relation Age of Onset  Diabetes Father  Hypertension Mother  Diabetes Mother    Social History  Tobacco Use  Smoking status: Never  Smokeless tobacco: Never  Vaping Use  Vaping Use: Some days  Substances: THC  Alcohol use: Yes  Comment: Socially  Drug use: Yes  Types: Cannabis    ROS:    10 point review of systems performed with pertinent positives and negatives per history of present illness    GENERAL: well developed, well nourished female, in no apparent distress  SKIN: anicteric  HEENT: normocephalic, sclera aniteric  NECK: supple, no JVD  RESPIRATORY: clear to auscultation  CARDIOVASCULAR: RRR  ABDOMEN: normal active BS, soft and no tenderness, no mass  LYMPHATIC: no lymphadenopathy  EXTREMITIES: no cyanosis or edema    IMPRESSION/PLAN:    1. Difficulty with bowel movements with known redundancy of the sigmoid colon most likely secondary to scar tissue. Patient also has diverticulosis. We again had a discussion as we did 3 years ago about surgical resection of her sigmoid colon redundancy. I believe this would be reasonable in light of her history and story. We discussed doing this robotically with the possibility of open conversion. The risk, benefits and alternatives were discussed in detail. I would like to repeat her double contrast barium enema to rule out other polyps or neoplasm. I do not believe a colonoscopy would be reasonable in light of the failure of her colonoscopy 3 years ago. 2. Seizure disorder    3.  History of DCIS

## 2023-10-23 NOTE — BRIEF OP NOTE
Pre-Operative Diagnosis: DIVERTICULOSIS     Post-Operative Diagnosis: DIVERTICULOSIS      Procedure Performed:   ROBOTIC ASSISTED LOW ANTERIOR COLON RESECTION CONVERTED TO OPEN, LYSIS OF ADHESIONS, PROCTOSCOPY, APPENEDECTOMY    Surgeon(s) and Role:     Wisam Johnson MD - Primary    Assistant(s):  Surgical Assistant.: ELLEN Rothman     Surgical Findings: see dictation     Specimen: colon, appendix     Estimated Blood Loss: Blood Output: 50 mL (10/23/2023 10:22 AM)        Kyle Alex MD  10/23/2023  10:27 AM

## 2023-10-23 NOTE — PROGRESS NOTES
NURSING ADMISSION NOTE      Patient admitted via Cart  Oriented to room. Safety precautions initiated. Bed in low position. Call light in reach. 1230: pt arrived to floor    Patient alert and oriented x4, vital signs stable on room air. IVF infusing, standby assist, pain controlled with prn morphine and hot packs. Tolerating diet. Safety measures in place, questions addressed.

## 2023-10-23 NOTE — RESPIRATORY THERAPY NOTE
Pt does wear CPAP at home. Pt brought home mask to hospital but was missing connecting piece from mask to tubing. Pt was offered hospital mask in replacement but refused. Pt opted for O2 Nasal Cannula and refused hospital CPAP.

## 2023-10-24 LAB
ANION GAP SERPL CALC-SCNC: 6 MMOL/L (ref 0–18)
BASOPHILS # BLD AUTO: 0.01 X10(3) UL (ref 0–0.2)
BASOPHILS NFR BLD AUTO: 0.1 %
BUN BLD-MCNC: 11 MG/DL (ref 7–18)
CALCIUM BLD-MCNC: 8.2 MG/DL (ref 8.5–10.1)
CHLORIDE SERPL-SCNC: 110 MMOL/L (ref 98–112)
CO2 SERPL-SCNC: 25 MMOL/L (ref 21–32)
CREAT BLD-MCNC: 0.72 MG/DL
EGFRCR SERPLBLD CKD-EPI 2021: 93 ML/MIN/1.73M2 (ref 60–?)
EOSINOPHIL # BLD AUTO: 0 X10(3) UL (ref 0–0.7)
EOSINOPHIL NFR BLD AUTO: 0 %
ERYTHROCYTE [DISTWIDTH] IN BLOOD BY AUTOMATED COUNT: 13 %
GLUCOSE BLD-MCNC: 114 MG/DL (ref 70–99)
HCT VFR BLD AUTO: 30.5 %
HGB BLD-MCNC: 10.2 G/DL
IMM GRANULOCYTES # BLD AUTO: 0.07 X10(3) UL (ref 0–1)
IMM GRANULOCYTES NFR BLD: 0.6 %
LYMPHOCYTES # BLD AUTO: 1.21 X10(3) UL (ref 1–4)
LYMPHOCYTES NFR BLD AUTO: 10.7 %
MAGNESIUM SERPL-MCNC: 1.9 MG/DL (ref 1.6–2.6)
MCH RBC QN AUTO: 30.2 PG (ref 26–34)
MCHC RBC AUTO-ENTMCNC: 33.4 G/DL (ref 31–37)
MCV RBC AUTO: 90.2 FL
MONOCYTES # BLD AUTO: 0.88 X10(3) UL (ref 0.1–1)
MONOCYTES NFR BLD AUTO: 7.8 %
NEUTROPHILS # BLD AUTO: 9.14 X10 (3) UL (ref 1.5–7.7)
NEUTROPHILS # BLD AUTO: 9.14 X10(3) UL (ref 1.5–7.7)
NEUTROPHILS NFR BLD AUTO: 80.8 %
OSMOLALITY SERPL CALC.SUM OF ELEC: 292 MOSM/KG (ref 275–295)
PHOSPHATE SERPL-MCNC: 3.5 MG/DL (ref 2.5–4.9)
PLATELET # BLD AUTO: 167 10(3)UL (ref 150–450)
POTASSIUM SERPL-SCNC: 3.7 MMOL/L (ref 3.5–5.1)
RBC # BLD AUTO: 3.38 X10(6)UL
SODIUM SERPL-SCNC: 141 MMOL/L (ref 136–145)
WBC # BLD AUTO: 11.3 X10(3) UL (ref 4–11)

## 2023-10-24 PROCEDURE — 99232 SBSQ HOSP IP/OBS MODERATE 35: CPT | Performed by: INTERNAL MEDICINE

## 2023-10-24 NOTE — PLAN OF CARE
A&Ox4. VSS. RA. . Denies chest pain and SOB. Telemetry: NSR-pacemaker- v-paced  GI: Abdomen soft, nondistended. Denies passing gas. Denies nausea. : valencia in place- will remove in AM   Pain controlled with PRN pain medications. Hot packs given for comfort  Up with standby assist.   Incisions: Midline incision with aquacel dressing- lap sites x5 (C/D/I)  Diet: Clears- ERAS  IVF running per order. All appropriate safety measures in place. All questions and concerns addressed.     Seizure precautions in place

## 2023-10-24 NOTE — PHYSICAL THERAPY NOTE
PHYSICAL THERAPY EVALUATION - INPATIENT     Room Number: 321/321-A  Evaluation Date: 10/24/2023  Type of Evaluation: Initial  Physician Order: PT Eval and Treat    Presenting Problem: S/p ROBOTIC ASSISTED LOW ANTERIOR COLON RESECTION CONVERTED TO OPEN, LYSIS OF ADHESIONS, PROCTOSCOPY, APPENEDECTOMY 10/23  Co-Morbidities : DCIS breast ca, migraines, DEMETRIUS, seizure, diverticulosis and sigmoid colon redundency  Reason for Therapy: Mobility Dysfunction and Discharge Planning    ASSESSMENT   In this PT evaluation, the patient presents with the following impairments 1) Abdominal pain consistent with post op status - educated on abdominal protective strategies with body mechanics and activity modifications. 2) Decreased activity tolerance and endurance - gait trained with RW and educated on stair navigation with pt able to perform mod I - SBA. Educated on recommendation for QID ambulation as appropriate. These impairments and comorbidities manifest themselves as functional limitations in independent bed mobility, transfers, and gait. Functional outcome measures completed include AMPAC. The AM-PAC '6-Clicks' Inpatient Basic Mobility Short Form was completed and this patient is demonstrating a Approx Degree of Impairment: 11.2%  degree of impairment in mobility. Research supports that patients with this level of impairment may benefit from dc home. DISCHARGE RECOMMENDATIONS  PT Discharge Recommendations: Home    PLAN      Patient has met all skilled IPPT goals at this time. Patient will be discharged from Physical Therapy services. Please re-order if a new functional limitation presents during this admission.             HOME SITUATION  Type of Home: House   Home Layout: Two level  Stairs to Enter : 1  Railing: Yes          Lives With: Spouse  Drives: Yes  Patient Owned Equipment: Rolling walker;Cane (does not use)  Patient Regularly Uses: Glasses    Prior Level of [place]: indep, works as a special ED teachers aid     SUBJECTIVE  \" The walker is helping\"       OBJECTIVE  Precautions: Abdominal protective strategies  Fall Risk: Standard fall risk    WEIGHT BEARING RESTRICTION  Weight Bearing Restriction: None                PAIN ASSESSMENT  Rating: Unable to rate  Location: abdominal  Management Techniques: Activity promotion; Body mechanics;Repositioning    COGNITION  Overall Cognitive Status:  WFL - within functional limits    RANGE OF MOTION AND STRENGTH ASSESSMENT  Upper extremity ROM and strength are within functional limits     Lower extremity ROM is within functional limits     Lower extremity strength is within functional limits       BALANCE  Static Sitting: Good  Dynamic Sitting: Good  Static Standing: Fair -  Dynamic Standing: Fair -    ADDITIONAL TESTS                                    ACTIVITY TOLERANCE           BP: 144/64  BP Location: Right arm  BP Method: Automatic  Patient Position: Sitting    O2 WALK       NEUROLOGICAL FINDINGS                        AM-PAC '6-Clicks' INPATIENT SHORT FORM - BASIC MOBILITY  How much difficulty does the patient currently have. .. Patient Difficulty: Turning over in bed (including adjusting bedclothes, sheets and blankets)?: None   Patient Difficulty: Sitting down on and standing up from a chair with arms (e.g., wheelchair, bedside commode, etc.): None   Patient Difficulty: Moving from lying on back to sitting on the side of the bed?: None   How much help from another person does the patient currently need. ..    Help from Another: Moving to and from a bed to a chair (including a wheelchair)?: None   Help from Another: Need to walk in hospital room?: None   Help from Another: Climbing 3-5 steps with a railing?: A Little       AM-PAC Score:  Raw Score: 23   Approx Degree of Impairment: 11.2%   Standardized Score (AM-PAC Scale): 56.93   CMS Modifier (G-Code): CI    FUNCTIONAL ABILITY STATUS  Gait Assessment   Functional Mobility/Gait Assessment  Gait Assistance: Modified independent  Distance (ft): 150  Assistive Device: Rolling walker  Pattern: Within Functional Limits  Stairs: Stairs  How Many Stairs: 4  Device: 1 Rail  Assist: Supervision  Pattern: Ascend and Descend  Ascend and Descend : Step to    Skilled Therapy Provided     Bed Mobility:  Rolling: mod I   Supine to sit: mod I       Transfer Mobility:  Sit to stand: SBA progressing to mod I    Stand to sit: mod I   Gait = SBA progressing to mod I     Therapist's Comments: pt educated on abdominal protective strategies, discussed body mechanics and activity modifications including bed mobility and stair navigation. Pt able to ambulate with device with gait training and postural re-ed with education on EC and pacing techniques      Exercise/Education Provided:  Bed mobility  Body mechanics  Energy conservation  Functional activity tolerated  Gait training  Transfer training    Patient End of Session: Up in chair;Needs met;Call light within reach;RN aware of session/findings; All patient questions and concerns addressed      Patient Evaluation Complexity Level:  History Low - no personal factors and/or co-morbidities   Examination of body systems Low - addressing 1-2 elements   Clinical Presentation Low - Stable   Clinical Decision Making Low - Stable       PT Session Time: 25 minutes  Gait Training: 10 minutes  Therapeutic Activity: 5 minutes  Neuromuscular Re-education:  minutes  Therapeutic Exercise:  minutes

## 2023-10-24 NOTE — OPERATIVE REPORT
[facility]    PATIENT'S NAME: Matilde Carvalho   ATTENDING PHYSICIAN: Jovanny Hernandez M.D. OPERATING PHYSICIAN: Jovanny Hernandez M.D. PATIENT ACCOUNT#:   [de-identified]    LOCATION:  [address] [place] [address]  MEDICAL RECORD #:   [id number]       YOB: 1960  ADMISSION DATE:       10/23/2023      OPERATION DATE:  10/23/2023    OPERATIVE REPORT    PREOPERATIVE DIAGNOSIS:  Chronic diverticulitis with colonic stricture. POSTOPERATIVE DIAGNOSIS:  Chronic diverticulitis with colonic stricture with extensive intraabdominal adhesions. PROCEDURE:    1.   Robotic-assisted low anterior colon resection with conversion to open. 2.   Extensive lysis of adhesions. 3.   Appendectomy. 4.   Proctoscopy. ASSISTANT:  ELLEN Rosenbaum. Surgical assist was medically necessary for the entirety of this case to help with position, prepping, opening, closing, suturing, retraction, and passage of robotic instrumentation. ANESTHESIA:  General.    ESTIMATED BLOOD LOSS:  500 mL. OPERATIVE TECHNIQUE:  The patient was taken to the operating suite at [facility] after informed consent and proper identification, administered general anesthetic, positioned in lithotomy position. Ayon catheter and rectal irrigation tubing were placed. The patient's abdomen was then prepped and draped in usual sterile fashion. A small incision was made in the right abdomen with a #11 scalpel. Veress needle was introduced in the peritoneal cavity. Pneumoperitoneum was created. An 8 mm robotic port was introduced along with a robotic scope. A 12 mm trocar was placed in right lower quadrant, another 8 mm trocar was placed in right upper quadrant, and an 8 mm robotic port was placed in the left upper quadrant. Assist port was placed in the right lateral abdomen. Robotic da Venita system was docked to the patient. Operating surgeon went to the console.   The patient had extensive anterior abdominal wall adhesions which were taken down meticulously robotically. The patient had small-bowel adhesed into the pelvis. This was also taken down robotically. Appendix was very adhesed into the pelvis requiring some denuding of the mesentery of the appendix as well as the serosa. Therefore, I elected to excise the appendix at a later time. Colon was plastered to the left pelvic sidewall. This was taken down more proximally, extended into the pelvis. This also extended into the bladder, and posthysterectomy remnants appeared to be a remnant of the left ovary and tube adherent to the colon. This was very difficult to separate. The colon was also adherent to the bladder with no clear plane. Left ureter was identified, preserved during the entire procedure, and was not involved in the adhesive disease to the colon. A period of time was done trying to separate out the hysterectomy remnants and bladder. I felt uncomfortable robotically and felt that I needed to get my hands in for better clarification. Therefore, I abandoned the robotic approach. The [employer] Venita robotic system was undocked from the patient. All ports were removed. Lower midline incision was then performed with a #10 scalpel. Dissection proceeded through the subcutaneous tissue and fascial layers, entering the abdominal cavity. Guevara retractor was placed. Dissection then proceeded proximally in the colon. It became apparent that I needed to divide the sigmoid colon proximally to allow for further dissection into the pelvis. Therefore, a window was created between the colon and the mesentery and divided with a BERNIE stapler. Mesentery was then taken down with the Enseal device down into the pelvis in a retrocolic plane. Anterior colon was adherent to the bladder and anterior peritoneal surfaces. This was taken down meticulously from the bladder and anterior abdominal wall.   The most difficult part of the dissection was laterally in the evident residual hysterectomy components which appeared to be residual ovary and tube. This was taken down meticulously. Hemostasis was required of the residual left ovary. Once this was freed up, rectal irrigation was performed until clear. A Contour stapler was utilized to divide across the rectum. Further mobilization of the sigmoid colon was then performed allowing for easy non-tension placement into the pelvis for anastomosis. Appendix was removed, taking down its mesentery with the LigaSure device, dividing the appendix with an endo BERNIE stapler. The staple line was removed from the proximal colon. The patient had very small colon and rectum, therefore, only a 25 mm anvil was able to be utilized. This was sutured into the end of the colon with a Prolene pursestring suture. Operating surgeon went below, passed sizers along with EEA stapler. An end-to-end anastomosis was created. Two intact donuts of tissue were removed from the stapler. Proctoscopy was performed holding proximal to the staple line. There was no evidence of air leakage with a widely patent and hemostatic anastomosis. Irrigation was then completed. Abdomen was irrigated. All laps removed. The 12 mm port fascia was closed with 0 Vicryl. The midline fascia was closed with a #1 looped PDS. All skin incisions were then closed with staples. Wounds were sterilely dressed. The patient tolerated the procedure well.     Dictated By Aurelia Roman M.D.  d: 10/23/2023 10:36:53  t: 10/23/2023 15:36:53  Job [id number]  TK/

## 2023-10-24 NOTE — PROGRESS NOTES
Alert & oriented x4. VSS on room air. DTV. Tolerating clear liquid diet. Ambulates with standby assist. Midline incision with aquacel and laps x5 with skin glue are c/d/I. Denies nausea/chest pain/SOB. Pain controlled per MAR. Patient updated on plan of care. Questions and concerns addressed. Safety precautions in place. Frequent rounds performed.

## 2023-10-25 PROBLEM — D72.829 LEUKOCYTOSIS: Status: ACTIVE | Noted: 2023-10-25

## 2023-10-25 PROBLEM — K57.90 DIVERTICULOSIS: Status: ACTIVE | Noted: 2023-10-25

## 2023-10-25 PROBLEM — G40.909 SEIZURE DISORDER (HCC): Status: ACTIVE | Noted: 2017-02-03

## 2023-10-25 LAB
ERYTHROCYTE [DISTWIDTH] IN BLOOD BY AUTOMATED COUNT: 13.1 %
HCT VFR BLD AUTO: 32.5 %
HGB BLD-MCNC: 10.8 G/DL
MCH RBC QN AUTO: 29.9 PG (ref 26–34)
MCHC RBC AUTO-ENTMCNC: 33.2 G/DL (ref 31–37)
MCV RBC AUTO: 90 FL
PLATELET # BLD AUTO: 166 10(3)UL (ref 150–450)
RBC # BLD AUTO: 3.61 X10(6)UL
WBC # BLD AUTO: 8.6 X10(3) UL (ref 4–11)

## 2023-10-25 PROCEDURE — 99232 SBSQ HOSP IP/OBS MODERATE 35: CPT | Performed by: INTERNAL MEDICINE

## 2023-10-25 NOTE — PROGRESS NOTES
Patient alert and oriented x4. VSS, on room air. Ambulates with standby assist. PRN pain medications given. Denies nausea. Voids. Pt udated on POC.

## 2023-10-25 NOTE — PROGRESS NOTES
Alert & oriented x4. VSS on room air. Voids with increased frequency. Tolerating soft diet. Ambulates with standby assist and walker. Midline incision MACI, laps x5 c/d/I. Denies nausea/chest pain/SOB. Pain controlled per MAR. Patient updated on plan of care. Questions and concerns addressed. Safety precautions in place. Frequent rounds performed.

## 2023-10-26 PROCEDURE — 99232 SBSQ HOSP IP/OBS MODERATE 35: CPT | Performed by: INTERNAL MEDICINE

## 2023-10-26 RX ORDER — BISACODYL 10 MG
10 SUPPOSITORY, RECTAL RECTAL
Status: DISCONTINUED | OUTPATIENT
Start: 2023-10-26 | End: 2023-10-27

## 2023-10-26 NOTE — PROGRESS NOTES
Alert & oriented x4. VSS on room air. Voids. Tolerating soft diet. Ambulates with standby assist. Denies nausea/chest pain/SOB. Pain controlled. Patient updated on plan of care. Questions and concerns addressed. Safety precautions in place. Frequent rounds performed. 10:29- one-time suppository given to pt per orders.

## 2023-10-26 NOTE — PLAN OF CARE
Alert x 4. VSS on RA. Tolerating diet, no complaints of nausea. Medicated for pain. Able to ambulate with assist of walker. DEMETRIUS without CPAP. Tele in place, V-paced. Abdominal incisions in tact. Seizure precautions in place. All current needs met.  Call light in reach

## 2023-10-27 ENCOUNTER — APPOINTMENT (OUTPATIENT)
Dept: GENERAL RADIOLOGY | Facility: HOSPITAL | Age: 63
DRG: 330 | End: 2023-10-27
Attending: INTERNAL MEDICINE
Payer: COMMERCIAL

## 2023-10-27 LAB
BASOPHILS # BLD AUTO: 0.04 X10(3) UL (ref 0–0.2)
BASOPHILS NFR BLD AUTO: 0.4 %
EOSINOPHIL # BLD AUTO: 0.11 X10(3) UL (ref 0–0.7)
EOSINOPHIL NFR BLD AUTO: 1 %
ERYTHROCYTE [DISTWIDTH] IN BLOOD BY AUTOMATED COUNT: 12.9 %
HCT VFR BLD AUTO: 35.4 %
HGB BLD-MCNC: 11.5 G/DL
IMM GRANULOCYTES # BLD AUTO: 0.06 X10(3) UL (ref 0–1)
IMM GRANULOCYTES NFR BLD: 0.5 %
LYMPHOCYTES # BLD AUTO: 1.11 X10(3) UL (ref 1–4)
LYMPHOCYTES NFR BLD AUTO: 9.9 %
MCH RBC QN AUTO: 30 PG (ref 26–34)
MCHC RBC AUTO-ENTMCNC: 32.5 G/DL (ref 31–37)
MCV RBC AUTO: 92.4 FL
MONOCYTES # BLD AUTO: 0.97 X10(3) UL (ref 0.1–1)
MONOCYTES NFR BLD AUTO: 8.6 %
NEUTROPHILS # BLD AUTO: 8.94 X10 (3) UL (ref 1.5–7.7)
NEUTROPHILS # BLD AUTO: 8.94 X10(3) UL (ref 1.5–7.7)
NEUTROPHILS NFR BLD AUTO: 79.6 %
PLATELET # BLD AUTO: 218 10(3)UL (ref 150–450)
RBC # BLD AUTO: 3.83 X10(6)UL
WBC # BLD AUTO: 11.2 X10(3) UL (ref 4–11)

## 2023-10-27 PROCEDURE — 99232 SBSQ HOSP IP/OBS MODERATE 35: CPT | Performed by: INTERNAL MEDICINE

## 2023-10-27 PROCEDURE — 74018 RADEX ABDOMEN 1 VIEW: CPT | Performed by: INTERNAL MEDICINE

## 2023-10-27 RX ORDER — METOCLOPRAMIDE HYDROCHLORIDE 5 MG/ML
10 INJECTION INTRAMUSCULAR; INTRAVENOUS EVERY 6 HOURS
Status: COMPLETED | OUTPATIENT
Start: 2023-10-27 | End: 2023-10-28

## 2023-10-27 RX ORDER — ACETAMINOPHEN 10 MG/ML
1000 INJECTION, SOLUTION INTRAVENOUS EVERY 6 HOURS
Status: DISCONTINUED | OUTPATIENT
Start: 2023-10-27 | End: 2023-10-31

## 2023-10-27 RX ORDER — BISACODYL 10 MG
10 SUPPOSITORY, RECTAL RECTAL
Status: DISCONTINUED | OUTPATIENT
Start: 2023-10-27 | End: 2023-10-31

## 2023-10-27 RX ORDER — KETOROLAC TROMETHAMINE 15 MG/ML
15 INJECTION, SOLUTION INTRAMUSCULAR; INTRAVENOUS EVERY 6 HOURS PRN
Status: DISPENSED | OUTPATIENT
Start: 2023-10-27 | End: 2023-10-29

## 2023-10-27 NOTE — PLAN OF CARE
Pt alert x 4. VSS on RA. No complaints of pain. Tolerating diet, no complaints of nausea. DEMETRIUS no CPAP. Tele in place, V-Paced. Able to ambulate with assist of a walker. Abdominal incisions intact. All current needs met. Call light in reach.

## 2023-10-27 NOTE — PLAN OF CARE
Alert and oriented X 4. VSS. Glasses. RA. DEMETRIUS. No cpap. Pacemaker/ tele paced. Heparin. Soft diet. Advanced diet as tolerated. Midline staples open to air. 5Lap sites with 2x2 paper tape. Seizure precautions. Abdomen is soft and distended. Updated on plan of care. Call light in reach.

## 2023-10-28 PROCEDURE — 99232 SBSQ HOSP IP/OBS MODERATE 35: CPT | Performed by: INTERNAL MEDICINE

## 2023-10-28 RX ORDER — FLUTICASONE PROPIONATE 50 MCG
1 SPRAY, SUSPENSION (ML) NASAL DAILY
Status: DISCONTINUED | OUTPATIENT
Start: 2023-10-28 | End: 2023-10-31

## 2023-10-28 NOTE — PLAN OF CARE
Pt alert and oriented x4. VS stable on room air, afebrile. SCDs bilaterally. Tolerating clear liquids, denies nausea. IVF infusing per order. Voiding freely, last BM 10/27. Pt reports passing some flatus. Pt c/o moderate abdominal pain, well controlled. Pt ambulating frequently in hallway, up with steady gait. Midline abdominal incision, staples approximated and MACI. Lap sites x4, gauze and tape CDI. Lap site x1 steristrips intact and MACI. Plan to discharge to home when bowel function and pain improves. Pt updated to plan of care, all questions answered. Instructed to call for assistance, call light within reach. Addendum- Pt reports passing less flatus this afternoon/evening. Pt c/o increased nausea/discomfort, antiemetic and pain medication given with minimal relief shown. Pt states 1 episode of emesis, states mucus from post-nasal drip. ROB BETH paged, see MAR. Gen surg paged, awaiting response.

## 2023-10-28 NOTE — PROGRESS NOTES
Pt is alert and oriented, on ra with 02 sats 98%, Tele-Vpaced, vss.  C/o pain at abdomen when she moves. Prn Toradol and scheduled tylenol IV as per mar. Pt reports nausea intermittently. Reglan scheduled. Midline incision with staples memo, CHG wiped, cdi. 5lap sites with staples, gauze and tegaderm dressing cdi. Pt npo with ice chips, IVF infusing, voiding adequately, clear yellow urine.

## 2023-10-29 PROBLEM — D62 ACUTE POSTOPERATIVE ANEMIA DUE TO EXPECTED BLOOD LOSS: Status: ACTIVE | Noted: 2023-10-29

## 2023-10-29 LAB
ANION GAP SERPL CALC-SCNC: 3 MMOL/L (ref 0–18)
BASOPHILS # BLD AUTO: 0.02 X10(3) UL (ref 0–0.2)
BASOPHILS NFR BLD AUTO: 0.2 %
BUN BLD-MCNC: 14 MG/DL (ref 7–18)
CALCIUM BLD-MCNC: 8.2 MG/DL (ref 8.5–10.1)
CHLORIDE SERPL-SCNC: 110 MMOL/L (ref 98–112)
CO2 SERPL-SCNC: 25 MMOL/L (ref 21–32)
CREAT BLD-MCNC: 0.54 MG/DL
EGFRCR SERPLBLD CKD-EPI 2021: 103 ML/MIN/1.73M2 (ref 60–?)
EOSINOPHIL # BLD AUTO: 0.24 X10(3) UL (ref 0–0.7)
EOSINOPHIL NFR BLD AUTO: 2.8 %
ERYTHROCYTE [DISTWIDTH] IN BLOOD BY AUTOMATED COUNT: 12.7 %
GLUCOSE BLD-MCNC: 87 MG/DL (ref 70–99)
HCT VFR BLD AUTO: 27 %
HGB BLD-MCNC: 9.2 G/DL
IMM GRANULOCYTES # BLD AUTO: 0.05 X10(3) UL (ref 0–1)
IMM GRANULOCYTES NFR BLD: 0.6 %
LYMPHOCYTES # BLD AUTO: 1.06 X10(3) UL (ref 1–4)
LYMPHOCYTES NFR BLD AUTO: 12.5 %
MCH RBC QN AUTO: 30.2 PG (ref 26–34)
MCHC RBC AUTO-ENTMCNC: 34.1 G/DL (ref 31–37)
MCV RBC AUTO: 88.5 FL
MONOCYTES # BLD AUTO: 0.46 X10(3) UL (ref 0.1–1)
MONOCYTES NFR BLD AUTO: 5.4 %
NEUTROPHILS # BLD AUTO: 6.65 X10 (3) UL (ref 1.5–7.7)
NEUTROPHILS # BLD AUTO: 6.65 X10(3) UL (ref 1.5–7.7)
NEUTROPHILS NFR BLD AUTO: 78.5 %
OSMOLALITY SERPL CALC.SUM OF ELEC: 286 MOSM/KG (ref 275–295)
PLATELET # BLD AUTO: 211 10(3)UL (ref 150–450)
POTASSIUM SERPL-SCNC: 3.6 MMOL/L (ref 3.5–5.1)
RBC # BLD AUTO: 3.05 X10(6)UL
SODIUM SERPL-SCNC: 138 MMOL/L (ref 136–145)
WBC # BLD AUTO: 8.5 X10(3) UL (ref 4–11)

## 2023-10-29 PROCEDURE — 99232 SBSQ HOSP IP/OBS MODERATE 35: CPT | Performed by: INTERNAL MEDICINE

## 2023-10-29 NOTE — PLAN OF CARE
Pt resting in bed. States she is  not feeling well today. Abd soft and tender, hypo bs, reports flatus. Denies nausea at the moment. Now on cl diet, will monitor. Midline incision with stable cdi, wound edge well approximated. Gauze on lap sited removed , incision cdi. Poc updated, pt verbalized understanding.

## 2023-10-29 NOTE — PLAN OF CARE
Problem: Patient/Family Goals  Goal: Patient/Family Long Term Goal  Description: Patient's Long Term Goal: Discharge home    Interventions:  - Pain tolerable  - Tolerating diet  - Return to previous ADL's  - See additional Care Plan goals for specific interventions  Outcome: Progressing  Goal: Patient/Family Short Term Goal  Description: Patient's Short Term Goal: Prepare for discharge home    Interventions:   - Advance diet as tolerated  - Transition IV to oral medication  - Encourage ambulation  - See additional Care Plan goals for specific interventions  Outcome: Progressing     Problem: GASTROINTESTINAL - ADULT  Goal: Minimal or absence of nausea and vomiting  Description: INTERVENTIONS:  - Maintain adequate hydration with IV or PO as ordered and tolerated  - Nasogastric tube to low intermittent suction as ordered  - Evaluate effectiveness of ordered antiemetic medications  - Provide nonpharmacologic comfort measures as appropriate  - Advance diet as tolerated, if ordered  - Obtain nutritional consult as needed  - Evaluate fluid balance  Outcome: Progressing  Goal: Maintains or returns to baseline bowel function  Description: INTERVENTIONS:  - Assess bowel function  - Maintain adequate hydration with IV or PO as ordered and tolerated  - Evaluate effectiveness of GI medications  - Encourage mobilization and activity  - Obtain nutritional consult as needed  - Establish a toileting routine/schedule  - Consider collaborating with pharmacy to review patient's medication profile  Outcome: Progressing  Goal: Maintains adequate nutritional intake (undernourished)  Description: INTERVENTIONS:  - Monitor percentage of each meal consumed  - Identify factors contributing to decreased intake, treat as appropriate  - Assist with meals as needed  - Monitor I&O, WT and lab values  - Obtain nutritional consult as needed  - Optimize oral hygiene and moisture  - Encourage food from home; allow for food preferences  - Enhance eating environment  Outcome: Progressing  Goal: Achieves appropriate nutritional intake (bariatric)  Description: INTERVENTIONS:  - Monitor for over-consumption  - Identify factors contributing to increased intake, treat as appropriate  - Monitor I&O, WT and lab values  - Obtain nutritional consult as needed  - Evaluate psychosocial factors contributing to over-consumption  Outcome: Progressing

## 2023-10-29 NOTE — PROGRESS NOTES
Notes reviewed    Apparently was nauseated yesterday    She thinks it was post nasal drip    Abd remains soft   wound fine    Plan  restart clear liquids

## 2023-10-29 NOTE — PROGRESS NOTES
Pt is alert and oriented, ra, vss  Denies nausea but c/o increased in pain at lower abdomen, IV dilaudid given with slight relief. Toradol prn and scheduled tylenol as per mar. Abdomen is soft, non distended, reports passing gas, active BS. Incision sites with staples, gauze and tape are cdi, memo.   NPO with IVF infusing  Up sba, voiding adequately  Plan of care discussed  call light in reach

## 2023-10-30 ENCOUNTER — APPOINTMENT (OUTPATIENT)
Dept: CT IMAGING | Facility: HOSPITAL | Age: 63
DRG: 330 | End: 2023-10-30
Attending: SURGERY
Payer: COMMERCIAL

## 2023-10-30 LAB
ADENOVIRUS PCR:: NOT DETECTED
B PARAPERT DNA SPEC QL NAA+PROBE: NOT DETECTED
B PERT DNA SPEC QL NAA+PROBE: NOT DETECTED
C PNEUM DNA SPEC QL NAA+PROBE: NOT DETECTED
CORONAVIRUS 229E PCR:: NOT DETECTED
CORONAVIRUS HKU1 PCR:: NOT DETECTED
CORONAVIRUS NL63 PCR:: NOT DETECTED
CORONAVIRUS OC43 PCR:: NOT DETECTED
CREAT BLD-MCNC: 0.45 MG/DL
EGFRCR SERPLBLD CKD-EPI 2021: 107 ML/MIN/1.73M2 (ref 60–?)
FLUAV RNA SPEC QL NAA+PROBE: NOT DETECTED
FLUBV RNA SPEC QL NAA+PROBE: NOT DETECTED
METAPNEUMOVIRUS PCR:: NOT DETECTED
MYCOPLASMA PNEUMONIA PCR:: NOT DETECTED
PARAINFLUENZA 1 PCR:: NOT DETECTED
PARAINFLUENZA 2 PCR:: NOT DETECTED
PARAINFLUENZA 3 PCR:: NOT DETECTED
PARAINFLUENZA 4 PCR:: NOT DETECTED
RHINOVIRUS/ENTERO PCR:: NOT DETECTED
RSV RNA SPEC QL NAA+PROBE: NOT DETECTED
SARS-COV-2 RNA NPH QL NAA+NON-PROBE: NOT DETECTED

## 2023-10-30 PROCEDURE — 74177 CT ABD & PELVIS W/CONTRAST: CPT | Performed by: SURGERY

## 2023-10-30 PROCEDURE — 99232 SBSQ HOSP IP/OBS MODERATE 35: CPT | Performed by: INTERNAL MEDICINE

## 2023-10-30 RX ORDER — RUFINAMIDE 40 MG/ML
1 SUSPENSION ORAL DAILY
Status: DISCONTINUED | OUTPATIENT
Start: 2023-10-30 | End: 2023-10-31

## 2023-10-30 RX ORDER — GUAIFENESIN 600 MG/1
600 TABLET, EXTENDED RELEASE ORAL 2 TIMES DAILY
Status: DISCONTINUED | OUTPATIENT
Start: 2023-10-30 | End: 2023-10-31

## 2023-10-30 RX ORDER — METOCLOPRAMIDE HYDROCHLORIDE 5 MG/ML
10 INJECTION INTRAMUSCULAR; INTRAVENOUS EVERY 6 HOURS SCHEDULED
Status: DISCONTINUED | OUTPATIENT
Start: 2023-10-30 | End: 2023-10-31

## 2023-10-30 RX ORDER — LIDOCAINE AND PRILOCAINE 25; 25 MG/G; MG/G
CREAM TOPICAL ONCE
Status: COMPLETED | OUTPATIENT
Start: 2023-10-30 | End: 2023-10-30

## 2023-10-30 RX ORDER — CETIRIZINE HYDROCHLORIDE 10 MG/1
10 TABLET ORAL DAILY
Status: DISCONTINUED | OUTPATIENT
Start: 2023-10-30 | End: 2023-10-31

## 2023-10-30 NOTE — PROGRESS NOTES
[facility] note: Duplicate Proton Pump Inhibitor with Histamine 2 blocker. Keyur Cazares is a 57year old patient who has been prescribed both famotidine (Pepcid)  And pantoprazole (Protonix). Pepcid was discontinued per [employer] approved protocol for duplicate therapy in adult patients for medications not ordered by gastroenterology.     Thank you,  Bran Warren, PharmD  10/30/2023

## 2023-10-30 NOTE — PROGRESS NOTES
A&Ox4. VSS. RA. Telemetry: NSR, V paced. Left Pacemaker  GI: Abdomen soft, rounded. Nauseous  : Voids. Pain controlled with PRN and scheduled pain medications  Up with standby assist.  Incisions: midline with staples, 5 lap sites with staples, steri strips left chest where pacemaker is. Diet: NPO ice chips  IVF running per order. All appropriate safety measures in place. All questions and concerns addressed.  Will continue to monitor    Patient taken down for CAT scan at [phone]

## 2023-10-30 NOTE — PROGRESS NOTES
Pt vss, c/o sharp and aching pain 7/10 at lower abdomen and some nausea. Did not have dinner, feels nauseous when she sees food. Dilaudid given, zofran was given at 1800. Pt abdomen is soft, passing gas, active bowel sounds. Pt declining heparin subcu due to soreness. Provided education on blood clot prophylactic treatment. Midline incision and 5 lap sites all with staples, cdi, memo. IVF infusing, call light in reach    2356:pt with dry heaving, scant clear sputum. VSS, afebrile. zofran given. Pain is 3/10 for abdomen.  Abdominal binder offered for comfort,

## 2023-10-31 VITALS
DIASTOLIC BLOOD PRESSURE: 67 MMHG | WEIGHT: 156 LBS | HEART RATE: 83 BPM | BODY MASS INDEX: 29.45 KG/M2 | HEIGHT: 61 IN | OXYGEN SATURATION: 98 % | TEMPERATURE: 98 F | RESPIRATION RATE: 18 BRPM | SYSTOLIC BLOOD PRESSURE: 156 MMHG

## 2023-10-31 LAB
BASOPHILS # BLD AUTO: 0.03 X10(3) UL (ref 0–0.2)
BASOPHILS NFR BLD AUTO: 0.3 %
EOSINOPHIL # BLD AUTO: 0.21 X10(3) UL (ref 0–0.7)
EOSINOPHIL NFR BLD AUTO: 2.1 %
ERYTHROCYTE [DISTWIDTH] IN BLOOD BY AUTOMATED COUNT: 12.8 %
HCT VFR BLD AUTO: 30.4 %
HGB BLD-MCNC: 10.5 G/DL
IMM GRANULOCYTES # BLD AUTO: 0.17 X10(3) UL (ref 0–1)
IMM GRANULOCYTES NFR BLD: 1.7 %
LYMPHOCYTES # BLD AUTO: 1.32 X10(3) UL (ref 1–4)
LYMPHOCYTES NFR BLD AUTO: 13.3 %
MCH RBC QN AUTO: 30 PG (ref 26–34)
MCHC RBC AUTO-ENTMCNC: 34.5 G/DL (ref 31–37)
MCV RBC AUTO: 86.9 FL
MONOCYTES # BLD AUTO: 0.64 X10(3) UL (ref 0.1–1)
MONOCYTES NFR BLD AUTO: 6.4 %
NEUTROPHILS # BLD AUTO: 7.59 X10 (3) UL (ref 1.5–7.7)
NEUTROPHILS # BLD AUTO: 7.59 X10(3) UL (ref 1.5–7.7)
NEUTROPHILS NFR BLD AUTO: 76.2 %
PLATELET # BLD AUTO: 275 10(3)UL (ref 150–450)
RBC # BLD AUTO: 3.5 X10(6)UL
WBC # BLD AUTO: 10 X10(3) UL (ref 4–11)

## 2023-10-31 PROCEDURE — 99232 SBSQ HOSP IP/OBS MODERATE 35: CPT | Performed by: INTERNAL MEDICINE

## 2023-10-31 RX ORDER — ACETAMINOPHEN 500 MG
1000 TABLET ORAL EVERY 6 HOURS PRN
Status: DISCONTINUED | OUTPATIENT
Start: 2023-10-31 | End: 2023-10-31

## 2023-10-31 RX ORDER — ACETAMINOPHEN 500 MG
500 TABLET ORAL EVERY 6 HOURS PRN
Status: DISCONTINUED | OUTPATIENT
Start: 2023-10-31 | End: 2023-10-31

## 2023-10-31 RX ORDER — TRAMADOL HYDROCHLORIDE 50 MG/1
TABLET ORAL EVERY 4 HOURS PRN
Qty: 20 TABLET | Refills: 0 | Status: SHIPPED | OUTPATIENT
Start: 2023-10-31

## 2023-10-31 NOTE — PLAN OF CARE
A&Ox4. VSS. RA. . Denies chest pain and SOB. Telemetry: pacemaker, v paced   GI: Abdomen soft, nondistended tender. Passing gas. Denies nausea at this time, scheduled reglan given . : Voids. Pain controlled with PRN pain medications. Up with standby assist.   Incisions: midline and laps with staples  Diet: ERAS clears  IVF running per order. All appropriate safety measures in place.  All questions and concerns addressed

## 2023-10-31 NOTE — PROGRESS NOTES
NURSING DISCHARGE NOTE    Discharged Home via Wheelchair. Accompanied by Family member and Support staff  Belongings Taken by patient/family. discussed discharge instructions with patient and family . Answered all questions .  Verbalized understanding , will  medicine from pharmacy

## 2023-10-31 NOTE — PROGRESS NOTES
A&Ox4. VSS. RA. Telemetry: NSR, V paced  GI: Abdomen soft, nondistended. Passing gas. Denies nausea. : Voids. Pain controlled with PRN pain medications  Up ad rock  Incisions: midline with staples, 5 lap sites with staples  Diet: soft  Saline locked  All appropriate safety measures in place. All questions and concerns addressed.  Will continue to monitor

## 2023-11-01 ENCOUNTER — PATIENT OUTREACH (OUTPATIENT)
Dept: CASE MANAGEMENT | Age: 63
End: 2023-11-01

## 2023-11-03 NOTE — PAYOR COMM NOTE
Discharge Notification    Patient Name: Sofia Heredia: [address] [place] [address] Drive #: [id number]  Authorization Number: [id number]  Admit Date/Time: 10/23/2023 5:29 AM  Discharge Date/Time: 10/31/2023 4:54 PM

## 2024-01-09 NOTE — IMAGING NOTE
Spoke with pt re: MRI apt tomorrow and reminded to arrive 30 min early. Pt verbalized understanding.

## 2024-01-10 ENCOUNTER — HOSPITAL ENCOUNTER (OUTPATIENT)
Dept: MRI IMAGING | Facility: HOSPITAL | Age: 64
Discharge: HOME | End: 2024-01-10
Attending: PHYSICIAN ASSISTANT
Payer: COMMERCIAL

## 2024-01-10 VITALS
DIASTOLIC BLOOD PRESSURE: 92 MMHG | RESPIRATION RATE: 16 BRPM | HEART RATE: 65 BPM | OXYGEN SATURATION: 97 % | SYSTOLIC BLOOD PRESSURE: 136 MMHG

## 2024-01-10 DIAGNOSIS — K86.2 PANCREAS CYST: ICD-10-CM

## 2024-01-10 PROCEDURE — A9575 INJ GADOTERATE MEGLUMI 0.1ML: HCPCS | Performed by: PHYSICIAN ASSISTANT

## 2024-01-10 PROCEDURE — 74183 MRI ABD W/O CNTR FLWD CNTR: CPT | Performed by: PHYSICIAN ASSISTANT

## 2024-01-10 RX ORDER — GADOTERATE MEGLUMINE 376.9 MG/ML
15 INJECTION INTRAVENOUS
Status: COMPLETED | OUTPATIENT
Start: 2024-01-10 | End: 2024-01-10

## 2024-01-10 RX ADMIN — GADOTERATE MEGLUMINE 14 ML: 376.9 INJECTION INTRAVENOUS at 12:58:00

## 2024-01-10 NOTE — IMAGING NOTE
Received patient Tasneem Small. Name and  verified.   Patient [employer] device interrogated in presence of [employer] rep Hayley, Amelia MRI Tech, and Rad RN.   Device programmed to MRI safe setting DOO 85bpm.  (order: DOO 10bpm above intrinsic but per rep was running at 68bpm intrinsic upon programming).  Rep programmed MRI safe mode before rad RN arrived, no pre programming vital signs performed.  Patient closely monitored in MRI. See VS Flowsheet.   Patient tolerated scan without any immediate complications noted.   Device restored to permanent device parameters post scan. Patient discharged in stable condition.

## 2025-01-20 ENCOUNTER — OFFICE VISIT (OUTPATIENT)
Dept: NEUROLOGY | Facility: CLINIC | Age: 65
End: 2025-01-20
Payer: COMMERCIAL

## 2025-01-20 VITALS
WEIGHT: 156 LBS | BODY MASS INDEX: 29 KG/M2 | HEART RATE: 70 BPM | RESPIRATION RATE: 16 BRPM | SYSTOLIC BLOOD PRESSURE: 122 MMHG | DIASTOLIC BLOOD PRESSURE: 70 MMHG

## 2025-01-20 DIAGNOSIS — G40.209 EPILEPSY WITH PARTIAL COMPLEX SEIZURES (HCC): Primary | ICD-10-CM

## 2025-01-20 DIAGNOSIS — M54.2 NECK PAIN: ICD-10-CM

## 2025-01-20 DIAGNOSIS — G43.919 REFRACTORY MIGRAINE: ICD-10-CM

## 2025-01-20 DIAGNOSIS — R41.3 SHORT-TERM MEMORY LOSS: ICD-10-CM

## 2025-01-20 DIAGNOSIS — Z79.899 ENCOUNTER FOR LONG-TERM (CURRENT) DRUG USE: ICD-10-CM

## 2025-01-20 DIAGNOSIS — G43.019 INTRACTABLE MIGRAINE WITHOUT AURA AND WITHOUT STATUS MIGRAINOSUS: ICD-10-CM

## 2025-01-20 PROCEDURE — 99204 OFFICE O/P NEW MOD 45 MIN: CPT | Performed by: OTHER

## 2025-01-20 RX ORDER — LAMOTRIGINE 25 MG/1
100 TABLET ORAL DAILY
Qty: 360 TABLET | Refills: 1 | Status: SHIPPED | OUTPATIENT
Start: 2025-01-20

## 2025-01-20 RX ORDER — RIZATRIPTAN BENZOATE 10 MG/1
TABLET ORAL
Qty: 12 TABLET | Refills: 0 | Status: SHIPPED | OUTPATIENT
Start: 2025-01-20

## 2025-01-20 NOTE — PATIENT INSTRUCTIONS
Refill policies:    Allow 2-3 business days for refills; controlled substances may take longer.  Contact your pharmacy at least 5 days prior to running out of medication and have them send an electronic request or submit request through the “request refill” option in your [employer] account.  Refills are not addressed on weekends; covering physicians do not authorize routine medications on weekends.  No narcotics or controlled substances are refilled after noon on Fridays or by on call physicians.  By law, narcotics must be electronically prescribed.  A 30 day supply with no refills is the maximum allowed.  If your prescription is due for a refill, you may be due for a follow up appointment.  To best provide you care, patients receiving routine medications need to be seen at least once a year.  Patients receiving narcotic/controlled substance medications need to be seen at least once every 3 months.  In the event that your preferred pharmacy does not have the requested medication in stock (e.g. Backordered), it is your responsibility to find another pharmacy that has the requested medication available.  We will gladly send a new prescription to that pharmacy at your request.    Scheduling Tests:    If your physician has ordered radiology tests such as MRI or CT scans, please contact Central Scheduling at [phone] right away to schedule the test.  Once scheduled, the [facility] Centralized Referral Team will work with your insurance carrier to obtain pre-certification or prior authorization.  Depending on your insurance carrier, approval may take 3-10 days.  It is highly recommended patients assure they have received an authorization before having a test performed.  If test is done without insurance authorization, patient may be responsible for the entire amount billed.      Precertification and Prior Authorizations:  If your physician has recommended that you have a procedure or additional testing performed the [facility]  Centralized Referral Team will contact your insurance carrier to obtain pre-certification or prior authorization.    You are strongly encouraged to contact your insurance carrier to verify that your procedure/test has been approved and is a COVERED benefit.  Although the [facility] Centralized Referral Team does its due diligence, the insurance carrier gives the disclaimer that \"Although the procedure is authorized, this does not guarantee payment.\"    Ultimately the patient is responsible for payment.   Thank you for your understanding in this matter.  Paperwork Completion:  If you require FMLA or disability paperwork for your recovery, please make sure to either drop it off or have it faxed to our office at [phone]. Be sure the form has your name and date of birth on it.  The form will be faxed to our Forms Department and they will complete it for you.  There is a 25$ fee for all forms that need to be filled out.  Please be aware there is a 10-14 day turnaround time.  You will need to sign a release of information (ANTONIO) form if your paperwork does not come with one.  You may call the Forms Department with any questions at [phone].  Their fax number is [phone].

## 2025-01-20 NOTE — PROGRESS NOTES
[facility] - [facility]   Neurology    Tasneem Small Patient Status:  No patient class for patient encounter    1960 MRN [id number]   Location [facility] Kathy Somers DO              HPI:   Tasneem Small is a(n) 64 year old female who presents at the request of Kathy Somers DO for evaluation of epilepsy and migraines. Dr. Murrell moved, and patient is transferring here. Was diagnosed with epilepsy in . Would have GTC, and only in the mornings. Would wake up disoriented. No myoclonic jerks. Has been on Lamictal for about 5 years. Is on 100mg daily of Lamictal. Prior to Lamictal, was on Dilantin, but switched to Lamictal due to side effects. Patient is not sure about her seizure medication history. Records show Lamictal use from at least . Reports last seizure was around . Seizures started with right head turn. At worst, seizures were a few times a year, that was several \"decades ago\"  Migraines are very refractory. Has failed Topamax, elavil, propranolol. Has been getting botox around . Prior to Botox, headaches were 28 days a month. Headaches after botox are once a week. Still are once a weeks. Last botox was in 2024. Takes excedrin about once a week.         Meds tried for migraines  Topamax  Elavil  Propranolol    Pertinent imaging and laboratory work-up:   EEG 2019- unable to open in Care everywhere  MRI brain 2019  IMPRESSION:   A few, nonspecific foci of T2 prolongation primarily in the subcortical white matter of the frontal   lobes, more conspicuous compared to the prior study, probably related to chronic migraines or   postinflammatory gliosis. Otherwise, unremarkable contrast-enhanced MRI of the brain.     Past Medical History:  Past Medical History:    Breast CA (HCC)    DCIS    DCIS (ductal carcinoma in situ)    Diverticulosis of large intestine    Ductal carcinoma in situ of breast    Glaucoma    Migraines     Obstructive sleep apnea (adult) (pediatric)    DMG PSG AHI 15 RDI REM AHI 11 SaO2 emiliano 91%    OTHER DISEASES     R Breast DCIS    Seizure disorder (HCC)    last seizure 2017    Sleep apnea    cpap    Visual impairment    glasses        Past Surgical History:  Past Surgical History:   Procedure Laterality Date          times 1    Cardiac pacemaker placement      Cholecystectomy  [facility]    Colonoscopy      Colonoscopy N/A 2020    Procedure: COLONOSCOPY;  Surgeon: Marcellus Joe MD;  Location:  ENDOSCOPY    Hx breast cancer Right 2008    DCIS    Hysterectomy      Lumpectomy right  08 [facility]    Lumpectomy right  08 [facility]    Re-Excision Right Breast Lumpectomy    Needle biopsy left  2007    Needle biopsy right  ,     Oral surgery procedure  Age 16    Stereotactic breast biopsy  07 [facility]    Bilateral    Stereotactic breast biopsy  09 [facility]    Right Breast 10 o'clock posterior    Tonsillectomy  Age 3       Family History:  family history includes DCIS (age of onset: 50) in her self; Diabetes in her father and mother; Hypertension in her mother.      Social History:   reports that she has never smoked. She has never used smokeless tobacco. She reports current alcohol use. She reports current drug use. Drug: Cannabis.    Allergies:  Allergies[1]    MEDICATIONS:    Current Outpatient Medications:     Rizatriptan Benzoate 10 MG Oral Tab, use at onset; may repeat once after 2 hours- ONLY 2 IN 24 HOUR PERIOD MAX.  This is a 30 day supply., Disp: 12 tablet, Rfl: 0    lamoTRIgine 25 MG Oral Tab, Take 4 tablets (100 mg total) by mouth daily., Disp: 360 tablet, Rfl: 1    OnabotulinumtoxinA (BOTOX) 200 units Injection Recon Soln, INJECT 155 UNITS  INTRAMUSCULARLY INTO THE  FACE, NECK AND HEAD EVERY  12 WEEKS (GIVEN AT MD  OFFICE, DISCARD UNUSED), Disp: 1 each, Rfl: 3    latanoprost 0.005 % Ophthalmic Solution, Place 1 drop into both eyes nightly.,  Disp: , Rfl:       Review of Systems:   A comprehensive 10 point review of systems was completed.     Pertinent positives and negatives noted in the HPI.         PHYSICAL EXAM:   Neurological Exam  Vitals  Vitals:    01/20/25 0820   BP: 122/70   Pulse: 70   Resp: 16     General Appearance: Patient is a 64 year old female in no acute distress  Cardiac: Normal rate & regular rhythm  Skin: There are no rashes or other skin lesions.  Musculoskeletal: There is no scoliosis, or joint deformities  Neurologic examination:  Mental status: Patient is alert, attentive, and oriented x 3. Language is coherent and fluent without aphasia. Memory, comprehension and ability to follow commands were intact.   Cranial nerves II-XII: Optic discs were sharp. Pupils were round and reacted to light. Extraocular movements were full. There was no face, jaw, palate or tongue weakness or atrophy. Facial sensation was normal. Hearing was grossly intact. Shoulder shrug was normal.   Motor exam revealed normal muscle bulk and tone. No atrophy or fasciculations. Manual muscle testing revealed MRC grade 5/5 strength throughout including proximal and distal muscles of the arms and legs.  Deep tendon reflexes were 2 at the biceps, brachioradialis, triceps, knee jerk, and ankle jerk. Plantar responses were flexor bilaterally.   Sensory exam revealed normal light touch perception. Vibratory perception and proprioception were intact at the toes. Pinprick and temperature were normal. Romberg sign was absent.  Complex motor skills revealed normal coordination. Finger-nose-finger intact.   Gait was narrow and stable, was able to walk on heels, toes and tandem without any difficulty.     ASSESSMENT/ACTIVE PROBLEM LIST:     Encounter Diagnoses   Name Primary?    Epilepsy with partial complex seizures (HCC) Yes    Refractory migraine     Intractable migraine without aura and without status migrainosus     Neck pain     Short-term memory loss         Discussion/Plan:  Epilepsy with partial complex seizures  Last seizure in 2022 or 2024  Continue Lamictal 100mg daily  Occasionally misses a dose  Check Lamictal level    Migraines without aura  Refractory to several medications, very responsive to Botox. Despite not having botox for 8 month, migraines currently are once a week  Main triggers are neck pain. Discussed PT, patient would prefer to hold off  Start rizatriptan 10mg as an abortive  Increase water intake  Counseled on medication overuse headache and prevention with rescue medications being taken not more than 2-3 times a week  Patient remembered later, did PT, was not helpful    Short term memory loss  Check B12, TSH  Has DEMETRIUS, but not able to tolerate CPAP machine- follow up with Pulm    Requested Prescriptions     Signed Prescriptions Disp Refills    Rizatriptan Benzoate 10 MG Oral Tab 12 tablet 0     Sig: use at onset; may repeat once after 2 hours- ONLY 2 IN 24 HOUR PERIOD MAX.  This is a 30 day supply.    lamoTRIgine 25 MG Oral Tab 360 tablet 1     Sig: Take 4 tablets (100 mg total) by mouth daily.          We discussed in depth regarding the diagnosis, prognosis, treatment. The patient was given ample opportunity to ask questions. All questions and concerns were addressed.     Celine Badillo, DO  Neuromuscular and General Neurology  [facility]             [1] No Known Allergies

## 2025-01-20 NOTE — PROGRESS NOTES
Patient states seizure for a while, unable to remember last seizure. Patient states headaches occur on and off. Patient states last botox was in 05/2024. Patient states 98% benefit with botox.

## 2025-01-31 ENCOUNTER — APPOINTMENT (OUTPATIENT)
Dept: CV DIAGNOSTICS | Facility: HOSPITAL | Age: 65
End: 2025-01-31
Attending: STUDENT IN AN ORGANIZED HEALTH CARE EDUCATION/TRAINING PROGRAM
Payer: COMMERCIAL

## 2025-01-31 ENCOUNTER — APPOINTMENT (OUTPATIENT)
Dept: CV DIAGNOSTICS | Facility: HOSPITAL | Age: 65
End: 2025-01-31
Attending: INTERNAL MEDICINE
Payer: COMMERCIAL

## 2025-01-31 ENCOUNTER — APPOINTMENT (OUTPATIENT)
Dept: GENERAL RADIOLOGY | Facility: HOSPITAL | Age: 65
End: 2025-01-31
Attending: EMERGENCY MEDICINE
Payer: COMMERCIAL

## 2025-01-31 ENCOUNTER — HOSPITAL ENCOUNTER (OUTPATIENT)
Facility: HOSPITAL | Age: 65
Setting detail: OBSERVATION
Discharge: HOME | End: 2025-02-01
Attending: EMERGENCY MEDICINE | Admitting: STUDENT IN AN ORGANIZED HEALTH CARE EDUCATION/TRAINING PROGRAM
Payer: COMMERCIAL

## 2025-01-31 ENCOUNTER — HOSPITAL ENCOUNTER (INPATIENT)
Facility: HOSPITAL | Age: 65
LOS: 1 days | Discharge: HOME | End: 2025-02-01
Attending: EMERGENCY MEDICINE | Admitting: STUDENT IN AN ORGANIZED HEALTH CARE EDUCATION/TRAINING PROGRAM
Payer: COMMERCIAL

## 2025-01-31 DIAGNOSIS — R07.9 CHEST PAIN OF UNCERTAIN ETIOLOGY: Primary | ICD-10-CM

## 2025-01-31 DIAGNOSIS — J10.1 INFLUENZA A: ICD-10-CM

## 2025-01-31 PROBLEM — G43.909 MIGRAINE: Status: ACTIVE | Noted: 2017-02-03

## 2025-01-31 LAB
ALBUMIN SERPL-MCNC: 4.5 G/DL (ref 3.2–4.8)
ALBUMIN/GLOB SERPL: 1.4 {RATIO} (ref 1–2)
ALP LIVER SERPL-CCNC: 76 U/L
ALT SERPL-CCNC: 21 U/L
ANION GAP SERPL CALC-SCNC: 8 MMOL/L (ref 0–18)
AST SERPL-CCNC: 25 U/L (ref ?–34)
ATRIAL RATE: 82 BPM
BASOPHILS # BLD AUTO: 0.01 X10(3) UL (ref 0–0.2)
BASOPHILS NFR BLD AUTO: 0.3 %
BILIRUB SERPL-MCNC: 0.6 MG/DL (ref 0.2–1.1)
BUN BLD-MCNC: 16 MG/DL (ref 9–23)
CALCIUM BLD-MCNC: 8.9 MG/DL (ref 8.7–10.6)
CHLORIDE SERPL-SCNC: 103 MMOL/L (ref 98–112)
CO2 SERPL-SCNC: 25 MMOL/L (ref 21–32)
CREAT BLD-MCNC: 0.89 MG/DL
EGFRCR SERPLBLD CKD-EPI 2021: 71 ML/MIN/1.73M2 (ref 60–?)
EOSINOPHIL # BLD AUTO: 0 X10(3) UL (ref 0–0.7)
EOSINOPHIL NFR BLD AUTO: 0 %
ERYTHROCYTE [DISTWIDTH] IN BLOOD BY AUTOMATED COUNT: 12.9 %
FLUAV + FLUBV RNA SPEC NAA+PROBE: NEGATIVE
FLUAV + FLUBV RNA SPEC NAA+PROBE: POSITIVE
GLOBULIN PLAS-MCNC: 3.2 G/DL (ref 2–3.5)
GLUCOSE BLD-MCNC: 116 MG/DL (ref 70–99)
HCT VFR BLD AUTO: 39.1 %
HGB BLD-MCNC: 13.6 G/DL
IMM GRANULOCYTES # BLD AUTO: 0.01 X10(3) UL (ref 0–1)
IMM GRANULOCYTES NFR BLD: 0.3 %
LIPASE SERPL-CCNC: 61 U/L (ref 12–53)
LYMPHOCYTES # BLD AUTO: 0.86 X10(3) UL (ref 1–4)
LYMPHOCYTES NFR BLD AUTO: 26.7 %
MCH RBC QN AUTO: 30.4 PG (ref 26–34)
MCHC RBC AUTO-ENTMCNC: 34.8 G/DL (ref 31–37)
MCV RBC AUTO: 87.3 FL
MONOCYTES # BLD AUTO: 0.5 X10(3) UL (ref 0.1–1)
MONOCYTES NFR BLD AUTO: 15.5 %
NEUTROPHILS # BLD AUTO: 1.84 X10 (3) UL (ref 1.5–7.7)
NEUTROPHILS # BLD AUTO: 1.84 X10(3) UL (ref 1.5–7.7)
NEUTROPHILS NFR BLD AUTO: 57.2 %
OSMOLALITY SERPL CALC.SUM OF ELEC: 284 MOSM/KG (ref 275–295)
P AXIS: 52 DEGREES
P-R INTERVAL: 260 MS
PLATELET # BLD AUTO: 158 10(3)UL (ref 150–450)
POTASSIUM SERPL-SCNC: 3.6 MMOL/L (ref 3.5–5.1)
PROT SERPL-MCNC: 7.7 G/DL (ref 5.7–8.2)
Q-T INTERVAL: 380 MS
QRS DURATION: 110 MS
QTC CALCULATION (BEZET): 443 MS
R AXIS: 89 DEGREES
RBC # BLD AUTO: 4.48 X10(6)UL
RSV RNA SPEC NAA+PROBE: NEGATIVE
SARS-COV-2 RNA RESP QL NAA+PROBE: NOT DETECTED
SODIUM SERPL-SCNC: 136 MMOL/L (ref 136–145)
T AXIS: 129 DEGREES
TROPONIN I SERPL HS-MCNC: 3 NG/L
VENTRICULAR RATE: 82 BPM
WBC # BLD AUTO: 3.2 X10(3) UL (ref 4–11)

## 2025-01-31 PROCEDURE — 71045 X-RAY EXAM CHEST 1 VIEW: CPT | Performed by: EMERGENCY MEDICINE

## 2025-01-31 PROCEDURE — 99223 1ST HOSP IP/OBS HIGH 75: CPT | Performed by: STUDENT IN AN ORGANIZED HEALTH CARE EDUCATION/TRAINING PROGRAM

## 2025-01-31 PROCEDURE — 93306 TTE W/DOPPLER COMPLETE: CPT | Performed by: STUDENT IN AN ORGANIZED HEALTH CARE EDUCATION/TRAINING PROGRAM

## 2025-01-31 RX ORDER — POLYETHYLENE GLYCOL 3350 17 G/17G
17 POWDER, FOR SOLUTION ORAL DAILY PRN
Status: DISCONTINUED | OUTPATIENT
Start: 2025-01-31 | End: 2025-02-01

## 2025-01-31 RX ORDER — OSELTAMIVIR PHOSPHATE 30 MG/1
30 CAPSULE ORAL 2 TIMES DAILY
Status: DISCONTINUED | OUTPATIENT
Start: 2025-01-31 | End: 2025-02-01

## 2025-01-31 RX ORDER — ACETAMINOPHEN 500 MG
1000 TABLET ORAL ONCE
Status: COMPLETED | OUTPATIENT
Start: 2025-01-31 | End: 2025-01-31

## 2025-01-31 RX ORDER — SENNOSIDES 8.6 MG
17.2 TABLET ORAL NIGHTLY PRN
Status: DISCONTINUED | OUTPATIENT
Start: 2025-01-31 | End: 2025-02-01

## 2025-01-31 RX ORDER — ASPIRIN 81 MG/1
324 TABLET, CHEWABLE ORAL ONCE
Status: COMPLETED | OUTPATIENT
Start: 2025-01-31 | End: 2025-01-31

## 2025-01-31 RX ORDER — ECHINACEA PURPUREA EXTRACT 125 MG
1 TABLET ORAL
Status: DISCONTINUED | OUTPATIENT
Start: 2025-01-31 | End: 2025-02-01

## 2025-01-31 RX ORDER — ENOXAPARIN SODIUM 100 MG/ML
40 INJECTION SUBCUTANEOUS DAILY
Status: DISCONTINUED | OUTPATIENT
Start: 2025-01-31 | End: 2025-02-01

## 2025-01-31 RX ORDER — METOCLOPRAMIDE HYDROCHLORIDE 5 MG/ML
5 INJECTION INTRAMUSCULAR; INTRAVENOUS EVERY 8 HOURS PRN
Status: DISCONTINUED | OUTPATIENT
Start: 2025-01-31 | End: 2025-02-01

## 2025-01-31 RX ORDER — BISACODYL 10 MG
10 SUPPOSITORY, RECTAL RECTAL
Status: DISCONTINUED | OUTPATIENT
Start: 2025-01-31 | End: 2025-02-01

## 2025-01-31 RX ORDER — ACETAMINOPHEN 500 MG
1000 TABLET ORAL EVERY 8 HOURS PRN
Status: DISCONTINUED | OUTPATIENT
Start: 2025-01-31 | End: 2025-02-01

## 2025-01-31 RX ORDER — ONDANSETRON 2 MG/ML
4 INJECTION INTRAMUSCULAR; INTRAVENOUS EVERY 6 HOURS PRN
Status: DISCONTINUED | OUTPATIENT
Start: 2025-01-31 | End: 2025-02-01

## 2025-01-31 RX ORDER — BENZONATATE 100 MG/1
200 CAPSULE ORAL 3 TIMES DAILY PRN
Status: DISCONTINUED | OUTPATIENT
Start: 2025-01-31 | End: 2025-02-01

## 2025-01-31 RX ORDER — LAMOTRIGINE 100 MG/1
100 TABLET ORAL DAILY
Status: DISCONTINUED | OUTPATIENT
Start: 2025-01-31 | End: 2025-02-01

## 2025-01-31 RX ORDER — SUMATRIPTAN 20 MG/1
20 SPRAY NASAL EVERY 2 HOUR PRN
Status: DISCONTINUED | OUTPATIENT
Start: 2025-01-31 | End: 2025-02-01

## 2025-01-31 NOTE — PLAN OF CARE
Assumed care @0730  VSS,   A&Ox4, generalized weakness  RA    AV paced,   Denies Pain, Up as tolerated.    Poor appetite. Nausea x1. Zofran given.  Awaiting echo results.    Updated POC with patient and family.      Problem: CARDIOVASCULAR - ADULT  Goal: Maintains optimal cardiac output and hemodynamic stability  Description: INTERVENTIONS:  - Monitor vital signs, rhythm, and trends  - Monitor for bleeding, hypotension and signs of decreased cardiac output  - Evaluate effectiveness of vasoactive medications to optimize hemodynamic stability  - Monitor arterial and/or venous puncture sites for bleeding and/or hematoma  - Assess quality of pulses, skin color and temperature  - Assess for signs of decreased coronary artery perfusion - ex. Angina  - Evaluate fluid balance, assess for edema, trend weights  Outcome: Progressing     Problem: GASTROINTESTINAL - ADULT  Goal: Minimal or absence of nausea and vomiting  Description: INTERVENTIONS:  - Maintain adequate hydration with IV or PO as ordered and tolerated  - Nasogastric tube to low intermittent suction as ordered  - Evaluate effectiveness of ordered antiemetic medications  - Provide nonpharmacologic comfort measures as appropriate  - Advance diet as tolerated, if ordered  - Obtain nutritional consult as needed  - Evaluate fluid balance  Outcome: Progressing

## 2025-01-31 NOTE — PLAN OF CARE
NURSING ADMISSION NOTE      Patient admitted via Wheelchair to 7616  Oriented to room.  Safety precautions initiated.  Bed in low position.  Call light in reach.  Received pt at 0500  Pt AOx4, AV Paced, RA, VSS  Currently denies chest pain  D/c Planning: Cards to see. Echo  Call light within reach.  Needs currently met

## 2025-01-31 NOTE — ED QUICK NOTES
Orders for admission, patient is aware of plan and ready to go upstairs. Any questions, please call ED RN Juju at extension [phone].     Patient Covid vaccination status: Partially vaccinated     COVID Test Ordered in ED: SARS-CoV-2/Flu A and B/RSV by PCR (GeneXpert)    COVID Suspicion at Admission: N/A    Running Infusions:  None    Mental Status/LOC at time of transport: a/ox4    Other pertinent information:   CIWA score: N/A   NIH score:  N/A

## 2025-01-31 NOTE — H&P
[facility]     Tasneem Small Patient Status:  Emergency    1960 MRN [id number]   Location [facility] EMERGENCY DEPARTMENT Attending Sarah Herrera DO   Hosp Day # 0 PCP Kathy Somers DO     Chief Complaint: Chest pain    History of Present Illness: Tasneem Small is a 64 year old female with PMHx AV block s/p PPM, Migraines, Seizure disorder who presents for chest pain.     Patient notes over the past 3 days she has been having ongoing migraine headaches.  However today she started off with dull pain in the center of the chest.  States that pain continued to get progressively worse and had sharp substernal chest pain approximately Sonny p.m. that lasted a few minutes and resolved spontaneously.  Pain did not radiate, no associated shortness of breath, diaphoresis, abdominal pain, nausea, vomiting, arm pain, jaw pain.  Pain came on at rest, no similar pain like this before.  No prior history of MI.  Patient noted to have last stress test 2023 with no evidence of ischemia though did note, AV block at that time.  Currently patient is chest pain-free    Past Medical History:  Past Medical History:    Breast CA (HCC)    DCIS    DCIS (ductal carcinoma in situ)    Diverticulosis of large intestine    Ductal carcinoma in situ of breast    Glaucoma    Migraines    Obstructive sleep apnea (adult) (pediatric)    DMG PSG AHI 15 RDI REM AHI 11 SaO2 emiliano 91%    OTHER DISEASES     R Breast DCIS    Seizure disorder (HCC)    last seizure 2017    Sleep apnea    cpap    Visual impairment    glasses        Past Surgical History:   Past Surgical History:   Procedure Laterality Date          times 1    Cardiac pacemaker placement      Cholecystectomy  [facility]    Colonoscopy      Colonoscopy N/A 2020    Procedure: COLONOSCOPY;  Surgeon: Marcellus Joe MD;  Location:  ENDOSCOPY    Hx breast cancer Right 2008    DCIS    Hysterectomy      Lumpectomy right   8-4-08 [facility]    Lumpectomy right  8-21-08 [facility]    Re-Excision Right Breast Lumpectomy    Needle biopsy left  01/01/2007    Needle biopsy right  2007, 2009    Oral surgery procedure  Age 16    Stereotactic breast biopsy  12-13-07 [facility]    Bilateral    Stereotactic breast biopsy  2-16-09 [facility]    Right Breast 10 o'clock posterior    Tonsillectomy  Age 3       Social History:  reports that she has never smoked. She has never used smokeless tobacco. She reports current alcohol use. She reports current drug use. Drug: Cannabis.    Family History:   Family History   Problem Relation Age of Onset    DCIS Self 50        DCIS    Hypertension Mother     Diabetes Mother     Diabetes Father        Allergies: Allergies[1]    Medications:  Medications Ordered Prior to Encounter[2]    Review of Systems:   A comprehensive 14 point review of systems was completed.    Pertinent positives and negatives noted in the HPI.    Physical Exam:    /78   Pulse 87   Temp 98.5 °F (36.9 °C) (Temporal)   Resp 12   Ht 5' 1\" (1.549 m)   Wt 153 lb (69.4 kg)   LMP 01/06/2008   SpO2 96%   BMI 28.91 kg/m²   General: No acute distress. Alert and oriented x 3.  HEENT: Normocephalic atraumatic. Moist mucous membranes. EOM-I. PERRLA. Anicteric.  Neck: No lymphadenopathy. No JVD. No carotid bruits.  Respiratory: Clear to auscultation bilaterally. No wheezes. No rhonchi.  Cardiovascular: S1, S2. Regular rate and rhythm. No murmurs, rubs or gallops. Equal pulses.   Chest and Back: No tenderness or deformity.  Abdomen: Soft, nontender, nondistended.  Positive bowel sounds. No rebound, guarding or organomegaly.  Neurologic: No focal neurological deficits. CNII-XII grossly intact.  Musculoskeletal: Moves all extremities.  Extremities: No edema or cyanosis.  Integument: No rashes or lesions.   Psychiatric: Appropriate mood and affect.    Diagnostic Data:      Labs:  Recent Labs   Lab 01/31/25  0042   WBC 3.2*   HGB 13.6   MCV 87.3    .0       Recent Labs   Lab 01/31/25  0042   *   BUN 16   CREATSERUM 0.89   CA 8.9   ALB 4.5      K 3.6      CO2 25.0   ALKPHO 76   AST 25   ALT 21   BILT 0.6   TP 7.7       Estimated Creatinine Clearance: 46.9 mL/min (based on SCr of 0.89 mg/dL).    No results for input(s): \"PTP\", \"INR\" in the last 168 hours.    No results for input(s): \"TROP\", \"CK\" in the last 168 hours.    Imaging: Imaging data reviewed in Epic.  No results found.      ASSESSMENT / PLAN:     # Chest pain    - Trend troponin's till downtrending   - CXR without acute findings, admission EKG personally reviewed -- showing paced rhythm   - TTE ordered   - Cardiology on consult   - Monitor on telemetry     # Seizure disorder - continue lamotrigine  # AV block s/p PPM   # Migraine HA - imitrex PRN       Code Status: Not on file    Plan of care discussed with patient, ED physician    Corby Lewis MD  1/31/2025                Supplementary Documentation:      MDM : Patient's ER labs, imaging reviewed.  AM labs ordered.  ER management discussed with ED physician, decision made for patient to be admitted to the hospital for further medical management.                  [1] No Known Allergies  [2]   No current facility-administered medications on file prior to encounter.     Current Outpatient Medications on File Prior to Encounter   Medication Sig Dispense Refill    lamoTRIgine 25 MG Oral Tab Take 4 tablets (100 mg total) by mouth daily. 360 tablet 1    latanoprost 0.005 % Ophthalmic Solution Place 1 drop into both eyes nightly.      Rizatriptan Benzoate 10 MG Oral Tab use at onset; may repeat once after 2 hours- ONLY 2 IN 24 HOUR PERIOD MAX.  This is a 30 day supply. (Patient not taking: Reported on 1/31/2025) 12 tablet 0    OnabotulinumtoxinA (BOTOX) 200 units Injection Recon Soln INJECT 155 UNITS  INTRAMUSCULARLY INTO THE  FACE, NECK AND HEAD EVERY  12 WEEKS (GIVEN AT MD  OFFICE, DISCARD UNUSED) (Patient not taking:  Reported on 1/31/2025) 1 each 3      Donor Site Anesthesia Type: same as repair anesthesia

## 2025-01-31 NOTE — ED PROVIDER NOTES
Patient Seen in: [facility] Emergency Department      History     Chief Complaint   Patient presents with    Chest Pain Angina     Stated Complaint: chest pain    Subjective:   HPI  Patient is a 64-year-old female with a history of seizures, symptomatic second-degree AV block status post pacemaker 2023 who presents the ED for evaluation of chest pain starting this evening around 4 PM while at rest.  Patient reports pain is central, stabbing and intermittent.  Pain is nonexertional, nonradiating, nonpleuritic.  Pt reports she felt some SOB when pain was severe. No alleviating factors. She reports that her pain was initially mild this evening but then became more severe while in bed which prompted her to come to the ED.  Patient reports lightheadedness and URI symptoms during the day today.  Denies any diaphoresis.  Reports nausea but no vomiting, no abdominal pain.  No history of blood clots, recent travel or surgeries, leg swelling, hemoptysis.  Currently pt denies any CP.     Objective:     Past Medical History:    Breast CA (HCC)    DCIS    DCIS (ductal carcinoma in situ)    Diverticulosis of large intestine    Ductal carcinoma in situ of breast    Glaucoma    Migraines    Obstructive sleep apnea (adult) (pediatric)    [facility] PSG AHI 15 RDI REM AHI 11 SaO2 emiliano 91%    OTHER DISEASES     R Breast DCIS    Seizure disorder (HCC)    last seizure 2017    Sleep apnea    cpap    Visual impairment    glasses              Past Surgical History:   Procedure Laterality Date          times 1    Cardiac pacemaker placement      Cholecystectomy  [address] [facility]    Colonoscopy      Colonoscopy N/A 2020    Procedure: COLONOSCOPY;  Surgeon: Marcellus Joe MD;  Location:  ENDOSCOPY    Hx breast cancer Right 2008    DCIS    Hysterectomy      Lumpectomy right  [address] [facility]    Lumpectomy right  [address] [facility]    Re-Excision Right Breast Lumpectomy    Needle biopsy left  2007    Needle  biopsy right  2007, 2009    Oral surgery procedure  Age 16    Stereotactic breast biopsy  12-13-07 [facility]    Bilateral    Stereotactic breast biopsy  2-16-09 [facility]    Right Breast 10 o'clock posterior    Tonsillectomy  Age 3                Social History     Socioeconomic History    Marital status:     Number of children: 1   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   Tobacco Use    Smoking status: Never    Smokeless tobacco: Never   Vaping Use    Vaping status: Every Day    Substances: THC   Substance and Sexual Activity    Alcohol use: Yes     Comment: couple drinks a month    Drug use: Yes     Types: Cannabis     Comment: nightly    Sexual activity: Yes     Birth control/protection: Hysterectomy   Other Topics Concern    Caffeine Concern Yes     Comment: 1 cup of coffee daily    Exercise Yes     Comment: walking 10,000 steps a day    Self-Exams No     Social Drivers of Health     Food Insecurity: No Food Insecurity (10/23/2023)    Food Insecurity     Food Insecurity: Never true   Transportation Needs: No Transportation Needs (10/23/2023)    Transportation Needs     Lack of Transportation: No   Housing Stability: Low Risk  (10/23/2023)    Housing Stability     Housing Instability: No                  Physical Exam     ED Triage Vitals [01/31/25 0011]   /77   Pulse 84   Resp 18   Temp 98.5 °F (36.9 °C)   Temp src Temporal   SpO2 94 %   O2 Device None (Room air)       Current Vitals:   Vital Signs  BP: 114/73  Pulse: 63  Resp: 15  Temp: 98.5 °F (36.9 °C)  Temp src: Temporal  MAP (mmHg): 87    Oxygen Therapy  SpO2: 96 %  O2 Device: None (Room air)        Physical Exam  Vitals and nursing note reviewed.   Constitutional:       General: She is not in acute distress.     Appearance: She is not ill-appearing.   HENT:      Head: Normocephalic and atraumatic.      Mouth/Throat:      Mouth: Mucous membranes are moist.   Eyes:      Extraocular Movements: Extraocular  movements intact.      Pupils: Pupils are equal, round, and reactive to light.   Cardiovascular:      Rate and Rhythm: Normal rate and regular rhythm.   Pulmonary:      Effort: Pulmonary effort is normal.   Chest:      Chest wall: No tenderness or edema.   Abdominal:      General: There is no distension.      Palpations: Abdomen is soft.      Tenderness: There is no abdominal tenderness.   Musculoskeletal:      Cervical back: Neck supple.      Right lower leg: No edema.      Left lower leg: No edema.   Skin:     General: Skin is warm and dry.      Capillary Refill: Capillary refill takes less than 2 seconds.   Neurological:      General: No focal deficit present.      Mental Status: She is alert.   Psychiatric:         Mood and Affect: Mood normal.             ED Course     Labs Reviewed   COMP METABOLIC PANEL (14) - Abnormal; Notable for the following components:       Result Value    Glucose 116 (*)     All other components within normal limits   CBC WITH DIFFERENTIAL WITH PLATELET - Abnormal; Notable for the following components:    WBC 3.2 (*)     Lymphocyte Absolute 0.86 (*)     All other components within normal limits   LIPASE - Abnormal; Notable for the following components:    Lipase 61 (*)     All other components within normal limits   SARS-COV-2/FLU A AND B/RSV BY PCR (GENEXPERT) - Abnormal; Notable for the following components:    Influenza A by PCR Positive (*)     All other components within normal limits    Narrative:     This test is intended for the qualitative detection and differentiation of SARS-CoV-2, influenza A, influenza B, and respiratory syncytial virus (RSV) viral RNA in nasopharyngeal or nares swabs from individuals suspected of respiratory viral infection consistent with COVID-19 by their healthcare provider. Signs and symptoms of respiratory viral infection due to SARS-CoV-2, influenza, and RSV can be similar.    Test performed using the Xpert Xpress SARS-CoV-2/FLU/RSV (real time  RT-PCR)  assay on the GeneXpert instrument, [place], [place] [address].   This test is being used under the Food and Drug Administration's Emergency Use Authorization.    The authorized Fact Sheet for Healthcare Providers for this assay is available upon request from the laboratory.   TROPONIN I HIGH SENSITIVITY - Normal   TROPONIN I HIGH SENSITIVITY - Normal     EKG    Rate, intervals and axes as noted on EKG Report.  Rate: 82  Rhythm: Paced Rhythm  Reading: This rhythm with nonspecific T wave abnormality in V4-V6, 1, aVL new from prior in 2023.                     MDM    Differential diagnosis before testing includes not limited to ACS, atypical chest pain, musculoskeletal pain, viral infection, myocarditis/pericarditis. Doubt PE/aortic dissection.    Patient is a 64-year-old female with a history of seizures, symptomatic second-degree AV block status post pacemaker 9/19/2023 who presents the ED for evaluation of chest pain starting this evening around 4 PM while at rest.  Patient reports pain is central, stabbing and intermittent.  Patient is afebrile, hemodynamically stable and satting well on room air.  On exam patient is nontoxic-appearing. Lungs CTAB.     Labs significant for mild lymphopenia, normal hemoglobin.  CMP unremarkable.  Troponin negative.  Lipase mildly elevated at 61 but not consistent with pancreatitis.  Repeat troponin ordered.  Chest x-ray independently reviewed and shows no evidence of focal consolidation or fluid overload. EKG shows nonspecific T wave abnormality in lateral leads, new from prior in 2023.     Patient has moderate HEART score of 4.  Shared decision making used and patient feels more complementing admitted to the hospital for further management.  Admitted to [facility] hospitalist who requests cards on consult. Ordered Aspirin. Covid/flu/RSV test pending.       Admission disposition: 1/31/2025  2:46 AM           Medical Decision Making  Amount and/or Complexity of Data  Reviewed  Labs: ordered. Decision-making details documented in ED Course.  Radiology: ordered and independent interpretation performed. Decision-making details documented in ED Course.  ECG/medicine tests: ordered and independent interpretation performed. Decision-making details documented in ED Course.    Risk  Decision regarding hospitalization.        Disposition and Plan     Clinical Impression:  1. Chest pain of uncertain etiology         Disposition:  Admit  1/31/2025  2:46 am    Follow-up:  No follow-up provider specified.        Medications Prescribed:  Current Discharge Medication List              Supplementary Documentation:         Hospital Problems       Present on Admission  Date Reviewed: 1/20/2025            ICD-10-CM Noted POA    Chest pain R07.9 1/31/2025 Unknown    Migraine G43.909 2/3/2017 Yes

## 2025-01-31 NOTE — CONSULTS
[facility] Consultation    Tasneem Small Patient Status:  Inpatient    1960 MRN [id number]   Location [address] Attending Corby Lewis, *   Hosp Day # 0 PCP Kathy Somers DO     Reason for Consultation:  chest pain      History of Present Illness:  Tasneem Small is a a(n) 64 year old female. She has hx of Sz disorder, PPM for  symptomatic bradycardia related to 2nd degree AVB, , DEMETRIUS, breast CA. She reports sharp, intense chest pain , left of sternum, without radiation, last night. Lasted about 10 minutes. Also notes headache, cough, low grade fever.  Came to ER.  Troponins are normal.  Positive for InfluenzaA.  Denies Diabetes, Hypertension , tobacco.    History:  Past Medical History:    Breast CA (HCC)    DCIS    DCIS (ductal carcinoma in situ)    Diverticulosis of large intestine    Ductal carcinoma in situ of breast    Glaucoma    Migraines    Obstructive sleep apnea (adult) (pediatric)    [facility] PSG AHI 15 RDI REM AHI 11 SaO2 emiliano 91%    OTHER DISEASES     R Breast DCIS    Seizure disorder (HCC)    last seizure     Sleep apnea    cpap    Visual impairment    glasses     Past Surgical History:   Procedure Laterality Date          times 1    Cardiac pacemaker placement      Cholecystectomy  [address] [facility]    Colonoscopy      Colonoscopy N/A 2020    Procedure: COLONOSCOPY;  Surgeon: Marcellus Joe MD;  Location:  ENDOSCOPY    Hx breast cancer Right 2008    DCIS    Hysterectomy      Lumpectomy right  [address] [facility]    Lumpectomy right  [facility]    Re-Excision Right Breast Lumpectomy    Needle biopsy left  2007    Needle biopsy right  ,     Oral surgery procedure  Age 16    Stereotactic breast biopsy  [address] [facility]    Bilateral    Stereotactic breast biopsy  [address] [facility]    Right Breast 10 o'clock posterior    Tonsillectomy  Age 3     Family History   Problem Relation Age of Onset    DCIS Self 48        DCIS     Patient will come to [facility] office to : prescription.   Patient was advised of location and hours: Yes.   Patient was advised to bring photo ID: Yes.    Patient elects another party to  item: no.     Patient has 6 pills left.    Please note:  Margoth states she is going out of town on Friday 5/17/19.  Per Margoth the pharmacist gives her a difficult time if she comes in too early for the script; however, her granddaughter is picking her up on Friday and she will be out of meds on Sunday.  Margoth is hoping some documentation will be on the script that she is allowed to  the medication on Friday.  Any questions Margoth can be reached at [phone].   Hypertension Mother     Diabetes Mother     Diabetes Father          Allergies:  Allergies[1]    Medications:   lamoTRIgine  100 mg Oral Daily    enoxaparin  40 mg Subcutaneous Daily       Continuous Infusions:      Social History:   reports that she has never smoked. She has never used smokeless tobacco. She reports current alcohol use. She reports current drug use. Drug: Cannabis.    Review of Systems:  All systems were reviewed and are negative except as described above in HPI.    Physical Exam:      Wt Readings from Last 3 Encounters:   01/31/25 153 lb (69.4 kg)   01/20/25 156 lb (70.8 kg)   10/23/23 156 lb (70.8 kg)       Vitals:    01/31/25 0315 01/31/25 0330 01/31/25 0459 01/31/25 0500   BP: 114/73   138/69   BP Location:    Left arm   Pulse: 62 63     Resp: 18 15  18   Temp:    98.5 °F (36.9 °C)   TempSrc:    Temporal   SpO2: 96% 96%  96%   Weight:   153 lb (69.4 kg)    Height:           Temp:  [98.5 °F (36.9 °C)] 98.5 °F (36.9 °C)  Pulse:  [62-87] 63  Resp:  [10-18] 18  BP: (114-144)/(69-78) 138/69  SpO2:  [94 %-97 %] 96 %    Temp: 98.5 °F (36.9 °C)  Pulse: 63  Resp: 18  BP: 138/69      General:  Appears comfortable  HEENT: No focal deficits.  Neck: No JVD, carotids 2+ no bruits.  Cardiac: Regular S1S2.  No S3, S4, rub, click.  No murmur.  Lungs:  minor rhonchi  Abdomen: Soft, non-tender.   Extremities: No LE edema.  No clubbing or cyanosis  Neurologic: Alert and oriented, normal affect.  Skin: Warm and dry.     Labs:      HEM:  Recent Labs   Lab 01/31/25 0042   WBC 3.2*   HGB 13.6   HCT 39.1   .0       Chem:  Recent Labs   Lab 01/31/25 0042      K 3.6      CO2 25.0   BUN 16   CREATSERUM 0.89   ALT 21   AST 25   ALB 4.5       No results for input(s): \"INR\" in the last 168 hours.                  Lab Results   Component Value Date    TROP <0.045 04/13/2021    TROP <0.045 04/10/2021    TROP <0.045 04/10/2021         Invalid input(s): \"PBNPML\"                 Telemetry:     Laboratories  and Data:  Diagnostics:    EKG, 1/31/2025:  NSR, V-paced    CXR, 1/31/2025:    Impression   CONCLUSION:  No focal consolidation.                Impression:    1.  Chest pain.  Low risk profile. Atypical for angina.  Normal troponins.  EKG not helpful given V-pacing.  May be related to known influenza    2. Influenza A    3. PPM for  symptomatic bradycardia related to 2nd degree AVB, 2023,    4. DEMETRIUS    5. Hx of Breast CA  Recommend:    1.  Echo to exclude Cardiomyopathy, pericardial preocess, wall motion abnormalities.  If normal, would not pursue ischemia eval unless pt reports recurring chest pains          Josias Kumar MD  1/31/2025  7:32 AM         [1] No Known Allergies

## 2025-01-31 NOTE — RESPIRATORY THERAPY NOTE
Patient with a history of DEMETRIUS. She states that she is not using a cpap at home. DEMETRIUS protocol in place.

## 2025-01-31 NOTE — ED INITIAL ASSESSMENT (HPI)
Pt ambulated into the ED with c/o chest pain that started at 1600 yesterday but worsened around 2300. Pt also endorses headache for the past 2 days.     Hx pacemaker

## 2025-02-01 VITALS
SYSTOLIC BLOOD PRESSURE: 120 MMHG | BODY MASS INDEX: 28.89 KG/M2 | OXYGEN SATURATION: 96 % | TEMPERATURE: 98 F | HEIGHT: 61 IN | HEART RATE: 73 BPM | WEIGHT: 153 LBS | DIASTOLIC BLOOD PRESSURE: 67 MMHG | RESPIRATION RATE: 13 BRPM

## 2025-02-01 LAB
ALBUMIN SERPL-MCNC: 4.1 G/DL (ref 3.2–4.8)
ALBUMIN/GLOB SERPL: 1.5 {RATIO} (ref 1–2)
ALP LIVER SERPL-CCNC: 69 U/L
ALT SERPL-CCNC: 17 U/L
ANION GAP SERPL CALC-SCNC: 6 MMOL/L (ref 0–18)
AST SERPL-CCNC: 21 U/L (ref ?–34)
BASOPHILS # BLD AUTO: 0.01 X10(3) UL (ref 0–0.2)
BASOPHILS NFR BLD AUTO: 0.4 %
BILIRUB SERPL-MCNC: 0.6 MG/DL (ref 0.2–1.1)
BUN BLD-MCNC: 16 MG/DL (ref 9–23)
CALCIUM BLD-MCNC: 8.9 MG/DL (ref 8.7–10.6)
CHLORIDE SERPL-SCNC: 104 MMOL/L (ref 98–112)
CO2 SERPL-SCNC: 29 MMOL/L (ref 21–32)
CREAT BLD-MCNC: 0.89 MG/DL
EGFRCR SERPLBLD CKD-EPI 2021: 71 ML/MIN/1.73M2 (ref 60–?)
EOSINOPHIL # BLD AUTO: 0.01 X10(3) UL (ref 0–0.7)
EOSINOPHIL NFR BLD AUTO: 0.4 %
ERYTHROCYTE [DISTWIDTH] IN BLOOD BY AUTOMATED COUNT: 12.8 %
GLOBULIN PLAS-MCNC: 2.8 G/DL (ref 2–3.5)
GLUCOSE BLD-MCNC: 93 MG/DL (ref 70–99)
HCT VFR BLD AUTO: 38.6 %
HGB BLD-MCNC: 13.1 G/DL
IMM GRANULOCYTES # BLD AUTO: 0 X10(3) UL (ref 0–1)
IMM GRANULOCYTES NFR BLD: 0 %
INR BLD: 1.05 (ref 0.8–1.2)
LYMPHOCYTES # BLD AUTO: 1 X10(3) UL (ref 1–4)
LYMPHOCYTES NFR BLD AUTO: 38.3 %
MAGNESIUM SERPL-MCNC: 1.9 MG/DL (ref 1.6–2.6)
MCH RBC QN AUTO: 29.4 PG (ref 26–34)
MCHC RBC AUTO-ENTMCNC: 33.9 G/DL (ref 31–37)
MCV RBC AUTO: 86.7 FL
MONOCYTES # BLD AUTO: 0.37 X10(3) UL (ref 0.1–1)
MONOCYTES NFR BLD AUTO: 14.2 %
NEUTROPHILS # BLD AUTO: 1.22 X10 (3) UL (ref 1.5–7.7)
NEUTROPHILS # BLD AUTO: 1.22 X10(3) UL (ref 1.5–7.7)
NEUTROPHILS NFR BLD AUTO: 46.7 %
OSMOLALITY SERPL CALC.SUM OF ELEC: 289 MOSM/KG (ref 275–295)
PHOSPHATE SERPL-MCNC: 3.8 MG/DL (ref 2.4–5.1)
PLATELET # BLD AUTO: 148 10(3)UL (ref 150–450)
POTASSIUM SERPL-SCNC: 4.3 MMOL/L (ref 3.5–5.1)
PROT SERPL-MCNC: 6.9 G/DL (ref 5.7–8.2)
PROTHROMBIN TIME: 13.8 SECONDS (ref 11.6–14.8)
RBC # BLD AUTO: 4.45 X10(6)UL
SODIUM SERPL-SCNC: 139 MMOL/L (ref 136–145)
WBC # BLD AUTO: 2.6 X10(3) UL (ref 4–11)

## 2025-02-01 PROCEDURE — 99239 HOSP IP/OBS DSCHRG MGMT >30: CPT | Performed by: INTERNAL MEDICINE

## 2025-02-01 RX ORDER — HYDROCODONE BITARTRATE AND HOMATROPINE METHYLBROMIDE ORAL SOLUTION 5; 1.5 MG/5ML; MG/5ML
2.5 LIQUID ORAL EVERY 8 HOURS PRN
Qty: 120 ML | Refills: 0 | Status: SHIPPED | OUTPATIENT
Start: 2025-02-01

## 2025-02-01 RX ORDER — ONDANSETRON 4 MG/1
4 TABLET, ORALLY DISINTEGRATING ORAL EVERY 4 HOURS PRN
Qty: 30 TABLET | Refills: 0 | Status: SHIPPED | OUTPATIENT
Start: 2025-02-01

## 2025-02-01 RX ORDER — FLUTICASONE PROPIONATE 50 MCG
1 SPRAY, SUSPENSION (ML) NASAL DAILY
Status: DISCONTINUED | OUTPATIENT
Start: 2025-02-01 | End: 2025-02-01

## 2025-02-01 RX ORDER — OSELTAMIVIR PHOSPHATE 30 MG/1
30 CAPSULE ORAL 2 TIMES DAILY
Qty: 10 CAPSULE | Refills: 0 | Status: SHIPPED | OUTPATIENT
Start: 2025-02-01 | End: 2025-02-06

## 2025-02-01 NOTE — PLAN OF CARE
Pt stable overnight. Tylenol ES given for ha w/ improvement.   Denies any cp or sob. VSS. Afebrile. AV paced on tele. O2 sats >92% on RA.   Pt cont to have generalized weakness and poor appetite.Tamiflu ordered.   No further c/o nausea. Pt sleeping most of the night.  Possible dc home in am. Will cont to monitor.    Problem: CARDIOVASCULAR - ADULT  Goal: Maintains optimal cardiac output and hemodynamic stability  Description: INTERVENTIONS:  - Monitor vital signs, rhythm, and trends  - Monitor for bleeding, hypotension and signs of decreased cardiac output  - Evaluate effectiveness of vasoactive medications to optimize hemodynamic stability  - Monitor arterial and/or venous puncture sites for bleeding and/or hematoma  - Assess quality of pulses, skin color and temperature  - Assess for signs of decreased coronary artery perfusion - ex. Angina  - Evaluate fluid balance, assess for edema, trend weights  Outcome: Progressing     Problem: GASTROINTESTINAL - ADULT  Goal: Minimal or absence of nausea and vomiting  Description: INTERVENTIONS:  - Maintain adequate hydration with IV or PO as ordered and tolerated  - Nasogastric tube to low intermittent suction as ordered  - Evaluate effectiveness of ordered antiemetic medications  - Provide nonpharmacologic comfort measures as appropriate  - Advance diet as tolerated, if ordered  - Obtain nutritional consult as needed  - Evaluate fluid balance  Outcome: Progressing

## 2025-02-01 NOTE — DISCHARGE SUMMARY
[facility]     Tasneem Small Patient Status:  Inpatient    1960 MRN [id number]   Location [facility] 7NE-A Attending Zoe French DO   Hosp Day # 1 PCP Kathy Somers DO     Date of Admission: 2025  Date of Discharge:   ***    Discharge Disposition: Home or Self Care    Discharge Diagnosis:  ***    History of Present Illness: ***    Brief Synopsis: ***    Lace+ Score: 31  59-90 High Risk  29-58 Medium Risk  0-28   Low Risk       TCM Follow-Up Recommendation:  {Care Managers will evaluate the need for follow-up for all patients ages 50+, and high/moderate risk patients ages 25-49. Low risk patients (LACE < 29) will only be evaluated if the \"Still recommend for TCM follow-up\" option is selected from this list.:[id number]}  **Certification    Admission date was 2025.  Inpatient stay was shorter than expected.  Patient's Chest pain of uncertain etiology was initially serious enough to expect a more lengthy hospitalization but patient improved faster than expected.                 Procedures during hospitalization:   ***    Incidental or significant findings and recommendations (brief descriptions):  ***    Lab/Test results pending at Discharge:   ***    Consultants:  ***    Discharge Medication List:     Discharge Medications        START taking these medications        Instructions Prescription details   oseltamivir 30 MG Caps  Commonly known as: Tamiflu      Take 1 capsule (30 mg total) by mouth 2 (two) times daily for 5 days.   Stop taking on: 2025  Quantity: 10 capsule  Refills: 0            CONTINUE taking these medications        Instructions Prescription details   lamoTRIgine 25 MG Tabs  Commonly known as: LaMICtal      Take 4 tablets (100 mg total) by mouth daily.   Quantity: 360 tablet  Refills: 1     latanoprost 0.005 % Soln  Commonly known as: Xalatan      Place 1 drop into both eyes nightly.   Refills: 0            ASK your doctor about  these medications        Instructions Prescription details   Botox 200 units Solr  Generic drug: onabotulinumtoxinA      INJECT 155 UNITS  INTRAMUSCULARLY INTO THE  FACE, NECK AND HEAD EVERY  12 WEEKS (GIVEN AT MD  OFFICE, DISCARD UNUSED)   Quantity: 1 each  Refills: 3     Rizatriptan Benzoate 10 MG Tabs  Commonly known as: MAXALT      use at onset; may repeat once after 2 hours- ONLY 2 IN 24 HOUR PERIOD MAX.  This is a 30 day supply.   Quantity: 12 tablet  Refills: 0               Where to Get Your Medications        These medications were sent to [id number] - [place], [place] - [address] AT [facility], [phone], [phone] [address], [facility] [address]      Phone: [phone]   oseltamivir 30 MG Caps         [place] reviewed[place] ***    Follow-up appointment:   No follow-up provider specified.  Appointments for Next 30 Days 2/1/2025 - 3/3/2025      None            Vital signs:  Temp:  [98.3 °F (36.8 °C)-99.1 °F (37.3 °C)] 98.8 °F (37.1 °C)  Pulse:  [60-83] 60  Resp:  [11-18] 15  BP: (101-132)/(59-78) 114/59  SpO2:  [94 %-96 %] 95 %    Physical Exam:    General: No acute distress   Lungs: clear to auscultation  Cardiovascular: S1, S2  Abdomen: Soft  ***    -----------------------------------------------------------------------------------------------  PATIENT DISCHARGE INSTRUCTIONS: See electronic chart    Zoe French,     Total time spent on discharge planning:  *** minutes     The 21st Century Cures Act makes medical notes like these available to patients in the interest of transparency. Please be advised this is a medical document. Medical documents are intended to carry relevant information, facts as evident, and the clinical opinion of the practitioner. The medical note is intended as peer to peer communication and may appear blunt or direct. It is written in medical language and may contain abbreviations or verbiage that are unfamiliar.      [phone]   HYDROcodone-homatropine 5-1.5 MG/5ML Soln  ondansetron 4 MG Tbdp  oseltamivir 30 MG Caps         [place]     Follow-up appointment:   Juan Carpenter MD  [address]  [facility] [address] [phone]    Follow up      Lizbet Kathy, DO  [address]  [place] [id number] [phone]    Follow up in 1 week(s)      Appointments for Next 30 Days 2025 - 3/8/2025      None            Vital signs:   /67 (BP Location: Left arm)   Pulse 73   Temp 97.9 °F (36.6 °C) (Oral)   Resp 13   Ht 5' 1\" (1.549 m)   Wt 153 lb (69.4 kg)   LMP 2008   SpO2 96%   BMI 28.91 kg/m²       Physical Exam:    General: No acute distress   Lungs: clear to auscultation  Cardiovascular: S1, S2  Abdomen: Soft    -----------------------------------------------------------------------------------------------  PATIENT DISCHARGE INSTRUCTIONS: See electronic chart    Zoe French DO    Total time spent on discharge plannin minutes     The  Century Cures Act makes medical notes like these available to patients in the interest of transparency. Please be advised this is a medical document. Medical documents are intended to carry relevant information, facts as evident, and the clinical opinion of the practitioner. The medical note is intended as peer to peer communication and may appear blunt or direct. It is written in medical language and may contain abbreviations or verbiage that are unfamiliar.

## 2025-02-01 NOTE — PROGRESS NOTES
[facility]       Patient: Tasneem Small Patient Status:  Inpatient    1960 MRN [id number]   [facility] 7NE-A Attending Zoe French DO   Hosp Day # 1 PCP Kathy Somers DO     S: No chest pain, shortness of breath.      Objective:    Vitals:    25 0745   BP:    Pulse:    Resp:    Temp: 98.8 °F (37.1 °C)       Last 3 Weights   25 0459 153 lb (69.4 kg)   25 0011 153 lb (69.4 kg)   25 0820 156 lb (70.8 kg)   10/23/23 1249 156 lb (70.8 kg)   10/23/23 0612 156 lb (70.8 kg)   10/05/23 1748 150 lb (68 kg)   23 1102 150 lb (68 kg)       No intake or output data in the 24 hours ending 25 1159        General: No apparent distress  HEENT: No focal deficits.  Neck: supple. JVP normal  Cardiac: Regular rhythm, S1, S2 normal,  rub or gallop.  No murmur.  Lungs: CTA  Abdomen: Soft, non-tender.   Extremities: No edema  Neurologic: no focal deficits  Skin: Warm and dry. No jaundice.        Data Review:    CMP  Recent Labs     25  0042 25  0557   CO2 25.0 29.0   ANIONGAP 8 6   BUN 16 16        CBC w/ Diff  Recent Labs     25  0042 25  0557   WBC 3.2* 2.6*   RBC 4.48 4.45   HGB 13.6 13.1   HCT 39.1 38.6   .0 148.0*          No results for input(s): \"TROP\", \"BNP\" in the last 168 hours.        1. Left ventricle: The cavity size was normal. Wall thickness was normal.      Systolic function was normal. The estimated ejection fraction was 60-65%,      by 3D assessment. Wall motion is normal; there are no regional wall      motion abnormalities. Left ventricular diastolic function is      indeterminate.   2. Right ventricle: The cavity size was normal. Systolic function was      normal.   3. Left atrium: The atrium was normal in size.   4. Aortic valve: There was no significant regurgitation.   Impressions:  No previous study was available for comparison.   *   All of the above data was reviewed personally.    Assessment:    CP, no ACS  - likely 2/2 flu  Flu A  AVB s/p PPM  DEMETRIUS    Plan:    Echo reviewed, reassuring  No plans for ischemia eval now, pt to report any recurrent chest pain symptoms  Ok for dc from cardiovascular standpoint, f/u prn      Juan Carpenter MD

## 2025-02-01 NOTE — PLAN OF CARE
NURSING DISCHARGE NOTE    Discharged Home via Wheelchair.  Accompanied by Family member  Belongings Taken by patient/family.    Education gone over, IV removed, follow up appointments gone over. No further questions.   All safety measures maintained       Problem: CARDIOVASCULAR - ADULT  Goal: Maintains optimal cardiac output and hemodynamic stability  Description: INTERVENTIONS:  - Monitor vital signs, rhythm, and trends  - Monitor for bleeding, hypotension and signs of decreased cardiac output  - Evaluate effectiveness of vasoactive medications to optimize hemodynamic stability  - Monitor arterial and/or venous puncture sites for bleeding and/or hematoma  - Assess quality of pulses, skin color and temperature  - Assess for signs of decreased coronary artery perfusion - ex. Angina  - Evaluate fluid balance, assess for edema, trend weights  Outcome: Adequate for Discharge     Problem: GASTROINTESTINAL - ADULT  Goal: Minimal or absence of nausea and vomiting  Description: INTERVENTIONS:  - Maintain adequate hydration with IV or PO as ordered and tolerated  - Nasogastric tube to low intermittent suction as ordered  - Evaluate effectiveness of ordered antiemetic medications  - Provide nonpharmacologic comfort measures as appropriate  - Advance diet as tolerated, if ordered  - Obtain nutritional consult as needed  - Evaluate fluid balance  Outcome: Adequate for Discharge

## 2025-02-04 ENCOUNTER — PATIENT OUTREACH (OUTPATIENT)
Dept: CASE MANAGEMENT | Age: 65
End: 2025-02-04

## 2025-02-04 LAB
LAMOTRIGINE: 2.2 MCG/ML (ref 2.5–15)
TSH W/REFLEX TO FT4: 1.85 MIU/L (ref 0.4–4.5)
VITAMIN B12: 501 PG/ML (ref 200–1100)

## 2025-02-04 NOTE — PROGRESS NOTES
TCM Request  Hospital Follow up for PCP (Discharge 2/1 EDW)     [address]   [address] [place] [address]  [phone]    Attempt #1:  Left message on voicemail for patient to call transitions specialist back to schedule follow up appointments. Provided Transitions specialist scheduling phone number [phone].

## 2025-02-05 NOTE — PROGRESS NOTES
TCM Request  Hospital Follow up for PCP (Discharge 2/1 EDW)     PCP   [address] [place] [address]  [phone]    Attempt #2:  Left message on voicemail for patient to call transitions specialist back to schedule follow up appointments. Provided Transitions specialist scheduling phone number [phone].

## 2025-02-06 PROBLEM — J10.1 INFLUENZA A: Status: ACTIVE | Noted: 2025-02-06

## 2025-02-06 NOTE — PROGRESS NOTES
TCM Request  Hospital Follow up for PCP (Discharge 2/1 EDW)      PCP   [place] [address]  [place] [address]  [phone]  Unable to reach pt after 3rd attempt        Attempt #3:  Left message on voicemail for patient to call transitions specialist back to schedule follow up appointments. Provided Transitions specialist scheduling phone number [phone]. Closing encounter. Will re-open if patient returns call.

## 2025-02-11 ENCOUNTER — TELEPHONE (OUTPATIENT)
Dept: NEUROLOGY | Facility: CLINIC | Age: 65
End: 2025-02-11

## 2025-02-11 NOTE — TELEPHONE ENCOUNTER
Spoke with patient who requests questions from Dr Badillo be sent to her via "[employer] as she is at work right now.

## 2025-02-11 NOTE — TELEPHONE ENCOUNTER
----- Message from Angely Badillo sent at 2/4/2025  4:26 PM CST -----  Left message on voicemail for patient to call back and inform how she took Lamictal the day of the blood draw on 1/30, as well as the few days leading up to the blood draw (including time of day, whether all of the 25mg tabs, or divided)

## 2025-02-14 RX ORDER — LAMOTRIGINE 100 MG/1
100 TABLET ORAL DAILY
COMMUNITY
Start: 2025-02-14

## 2025-02-14 NOTE — TELEPHONE ENCOUNTER
Noted. Patient takes 75-100mg daily at 7:30am, but did not take on am of blood draw, which was drawn after 11am. Lamictal level is low as it was drawn more than 3 hours after typical am dose time. Recommend that she take 100mg daily.

## 2025-02-14 NOTE — TELEPHONE ENCOUNTER
Message left on voice mail and Presentain message sent to patient with dr Badillo's recommendation to take Lamotrigine 100 mg daily.

## 2025-04-01 ENCOUNTER — TELEPHONE (OUTPATIENT)
Dept: FAMILY MEDICINE CLINIC | Facility: CLINIC | Age: 65
End: 2025-04-01

## 2025-04-01 ENCOUNTER — OFFICE VISIT (OUTPATIENT)
Dept: FAMILY MEDICINE CLINIC | Facility: CLINIC | Age: 65
End: 2025-04-01
Payer: COMMERCIAL

## 2025-04-01 VITALS
DIASTOLIC BLOOD PRESSURE: 64 MMHG | WEIGHT: 152 LBS | HEART RATE: 76 BPM | OXYGEN SATURATION: 97 % | TEMPERATURE: 98 F | BODY MASS INDEX: 29 KG/M2 | RESPIRATION RATE: 16 BRPM | SYSTOLIC BLOOD PRESSURE: 138 MMHG

## 2025-04-01 DIAGNOSIS — I10 PRIMARY HYPERTENSION: Primary | ICD-10-CM

## 2025-04-01 DIAGNOSIS — K86.2 PANCREATIC CYST (HCC): ICD-10-CM

## 2025-04-01 DIAGNOSIS — F43.9 STRESS AT HOME: ICD-10-CM

## 2025-04-01 DIAGNOSIS — Z00.00 ROUTINE HEALTH MAINTENANCE: Primary | ICD-10-CM

## 2025-04-01 PROBLEM — I44.1 AV BLOCK, MOBITZ 2: Status: ACTIVE | Noted: 2023-09-05

## 2025-04-01 PROCEDURE — 99214 OFFICE O/P EST MOD 30 MIN: CPT | Performed by: FAMILY MEDICINE

## 2025-04-01 RX ORDER — LOSARTAN POTASSIUM 25 MG/1
25 TABLET ORAL DAILY
Qty: 90 TABLET | Refills: 0 | Status: SHIPPED | OUTPATIENT
Start: 2025-04-01

## 2025-04-01 NOTE — TELEPHONE ENCOUNTER
Please enter lab orders for the patient's upcoming physical appointment.     Physical scheduled:   Your appointments       Date & Time Appointment Department ([place])    May 05, 2025 3:40 PM CDT Physical - Established with Kathy Somers DO [facility] ([facility])              [facility]  [address] [place] [id number]  [phone]           Preferred lab: [facility]     The patient has been notified to complete fasting labs prior to their physical appointment.

## 2025-04-01 NOTE — PROGRESS NOTES
Chief Complaint:  Chief Complaint   Patient presents with    Follow - Up     High blood pressure       HPI:  This is a 64 year old female patient presenting for Follow - Up (High blood pressure)    History of Present Illness  Tasneem Small is a 64 year old female with a history of pacemaker placement who presents with concerns about elevated blood pressure.    She has been experiencing elevated blood pressure since having COVID, with home readings fluctuating and reaching as high as 157/129 or 163. She is uncertain about the accuracy of these readings due to the wrist cuff used. No current headaches or pressure sensations related to blood pressure, but she feels overwhelmed by her circumstances. Her last cardiology visit a month ago showed normal blood pressure, and she is not currently on any antihypertensive medication.    She received a pacemaker in 2024 for an AV block Mobitz type two. She was hospitalized in January with chest pain and flu-like symptoms, including major headaches, which have since resolved. During her last hospital visit, no concerns about high blood pressure were raised.    A spot on her pancreas was identified as a few cysts communicating with the duct, likely benign. She was advised to follow up with an MRI in twelve months, which has not yet been completed.    Her  recently had a stroke and is currently at [facility] for rehabilitation, causing significant stress. She describes the situation as a 'roller coaster' and is facing challenges with managing legal and financial responsibilities. She has support from friends but is overwhelmed by the circumstances.    Chronic conditions:  Mobitz Type II, s/p pacemaker placement.   Glaucoma- sees [facility].  Seizures, migraines- follows with Dr. Badillo. On lamictal and receives botox injections. Rizatriptan prn  Hx of DCIS breast- R lumpectomy 2008- follows with Dr. Joe.  Insomnia- utilizes THC gummies with [employer]  Maintenance:  Health Maintenance   Topic Date Due    Pneumococcal Vaccine: 50+ Years (1 of 1 - PCV) Never done    Zoster Vaccines (1 of 2) Never done    DEXA Scan  2023    Annual Physical  2024    COVID-19 Vaccine (2 -  season) 2024    Mammogram  2024    Tobacco Cessation Counseling  Never done    Influenza Vaccine (Season Ended) 10/01/2025    Colorectal Cancer Screening  2030    Annual Depression Screening  Completed    Fall Risk Screening (Annual)  Completed    Meningococcal B Vaccine  Aged Out       ROS: Review of systems performed and negative unless stated in HPI.    Past medical, family and social history reviewed and listed as below.    HISTORY:  Past Medical History:    Breast CA (HCC)    DCIS    DCIS (ductal carcinoma in situ)    Diverticulosis of large intestine    Ductal carcinoma in situ of breast    Glaucoma    Migraines    Obstructive sleep apnea (adult) (pediatric)    DMG PSG AHI 15 RDI REM AHI 11 SaO2 emiliano 91%    OTHER DISEASES     R Breast DCIS    Seizure disorder (HCC)    last seizure     Sleep apnea    cpap    Visual impairment    glasses      Past Surgical History:   Procedure Laterality Date          times 1    Cardiac pacemaker placement      Cholecystectomy  12 [facility]    Colonoscopy      Colonoscopy N/A 2020    Procedure: COLONOSCOPY;  Surgeon: Marcellus Joe MD;  Location:  ENDOSCOPY    Hx breast cancer Right 2008    DCIS    Hysterectomy      Lumpectomy right  08 [facility]    Lumpectomy right  08 [facility]    Re-Excision Right Breast Lumpectomy    Needle biopsy left  2007    Needle biopsy right  ,     Oral surgery procedure  Age 16    Stereotactic breast biopsy  07 [facility]    Bilateral    Stereotactic breast biopsy  09 [facility]    Right Breast 10 o'clock posterior    Tonsillectomy  Age 3      Family History   Problem Relation Age of Onset    DCIS Self 50        DCIS    Hypertension  Mother     Diabetes Mother     Diabetes Father       Social History     Socioeconomic History    Marital status:     Number of children: 1   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   Tobacco Use    Smoking status: Never    Smokeless tobacco: Never   Vaping Use    Vaping status: Every Day    Substances: THC   Substance and Sexual Activity    Alcohol use: Yes     Comment: couple drinks a month    Drug use: Yes     Types: Cannabis     Comment: nightly    Sexual activity: Yes     Birth control/protection: Hysterectomy   Other Topics Concern    Caffeine Concern Yes     Comment: 1 cup of coffee daily    Exercise Yes     Comment: walking 10,000 steps a day    Self-Exams No     Social Drivers of Health     Food Insecurity: No Food Insecurity (1/31/2025)    Food Insecurity     Food Insecurity: Never true   Transportation Needs: No Transportation Needs (1/31/2025)    Transportation Needs     Lack of Transportation: No   Housing Stability: Low Risk  (1/31/2025)    Housing Stability     Housing Instability: No        Current Outpatient Medications   Medication Sig Dispense Refill    losartan 25 MG Oral Tab Take 1 tablet (25 mg total) by mouth daily. 90 tablet 0    lamoTRIgine 100 MG Oral Tab Take 1 tablet (100 mg total) by mouth daily.      HYDROcodone-homatropine (HYDROMET) 5-1.5 MG/5ML Oral Solution Take 2.5 mL by mouth every 8 (eight) hours as needed. 120 mL 0    ondansetron 4 MG Oral Tablet Dispersible Take 1 tablet (4 mg total) by mouth every 4 (four) hours as needed. 30 tablet 0    Rizatriptan Benzoate 10 MG Oral Tab use at onset; may repeat once after 2 hours- ONLY 2 IN 24 HOUR PERIOD MAX.  This is a 30 day supply. 12 tablet 0    lamoTRIgine 25 MG Oral Tab Take 4 tablets (100 mg total) by mouth daily. 360 tablet 1    OnabotulinumtoxinA (BOTOX) 200 units Injection Recon Soln INJECT 155 UNITS  INTRAMUSCULARLY INTO THE  FACE, NECK AND HEAD EVERY  12 WEEKS (GIVEN AT MD  OFFICE,  DISCARD UNUSED) 1 each 3    latanoprost 0.005 % Ophthalmic Solution Place 1 drop into both eyes nightly.       Allergies:  Allergies[1]    EXAM:  Vitals:    04/01/25 1750 04/01/25 1802   BP: 142/78 138/64   BP Location: Right arm    Pulse: 76    Resp: 16    Temp: 98.2 °F (36.8 °C)    TempSrc: Temporal    SpO2: 97%    Weight: 152 lb (68.9 kg)      GENERAL: vitals reviewed and listed above, alert, oriented, appears well hydrated and in no acute distress  HEENT: atraumatic, conjunctiva clear, no obvious abnormalities on inspection   LUNGS: clear to auscultation bilaterally, no wheezes, rales or rhonchi, good air movement  CV: HRRR, no murmurs, no peripheral edema   EXTREMITIES: warm and well perfused  PSYCH: pleasant and cooperative, tearful    ASSESSMENT AND PLAN:  Discussed the following assessment   1. Primary hypertension (Primary)  -     Losartan Potassium; Take 1 tablet (25 mg total) by mouth daily.  Dispense: 90 tablet; Refill: 0  -     Basic Metabolic Panel (8)  2. Pancreatic cyst (HCC)  -     MRI ABDOMEN AND MRCP W/3D (W+W0)(CPT=74183/88870); Future; Expected date: 04/01/2025      Advised the following:  Assessment & Plan  Hypertension  Blood pressure has been elevated since COVID-19, with home readings from 157/29 to 163/unknown. Stress from her 's stroke may contribute to elevated blood pressure. Today's blood pressure was borderline high, with the first reading slightly elevated and the second in the 130s. No headaches or pressure sensations reported. Cardiologist visit a month ago did not note any issues with blood pressure. Decision made to start low-dose losartan to manage blood pressure and reduce cardiac workload. Discussed that losartan works with the kidneys to lower blood pressure. Informed her to monitor for symptoms of hypotension such as lightheadedness or dizziness and to report these if they occur.  - Start losartan at t25mg   - Monitor blood pressure at home and report any symptoms of  hypotension such as lightheadedness or dizziness.  - Schedule follow-up appointment in one month to reassess blood pressure.  - Order non-fasting blood test to check kidney function within a month of starting losartan.    Pancreatic cysts  MRI from January 2024 showed a few subcentimeter cystic foci throughout the pancreas, likely communicating with the main pancreatic duct. Largest cyst measured 8x8 mm. No concerning or complex features noted. Differential diagnosis includes pseudocyst or cystic neoplasm, both likely benign and unlikely to become cancerous. Follow-up MRI recommended in 12 months to monitor for changes.  - Order MRI of the pancreas with MRCP to be done as a follow-up.    Post-colectomy status  Underwent partial colectomy for recurrent diverticulitis/diverticulosis in October 2023. Surgery was complicated by abscess formation, but has since recovered without difficulty.    Goals of Care  Experiencing significant stress due to her 's recent stroke and current rehabilitation at [facility]. Seeking information on obtaining power of  for her  and managing financial responsibilities. Discussed the need for a  or  to assist with these issues. Encouraged to seek support from friends and consider counseling or therapy to manage stress.  - Encourage seeking assistance from a  or  at [facility] for guidance on obtaining power of  and managing financial responsibilities.  - Offer resources for counseling or therapy to help manage stress.    Recording duration: 27 minutes    Kathy Somers DO  4/1/2025 6:00 PM  Family Medicine    Note to Patient  The 21st Century Cures Act makes medical notes like these available to patients in the interest of transparency. However, be advised this is a medical document and is intended as xtmd-nr-zvyw communication; it is written in medical language and may appear blunt, direct, or contain  abbreviations or verbiage that are unfamiliar. Medical documents are intended to carry relevant information, facts as evident, and the clinical opinion of the practitioner.     This report has been produced using speech recognition software, and may contain errors related to grammar, punctuation, spelling, words or phrases unrecognized or not translated appropriately to text; these errors may be referred to the dictating provider for further clarification and/or addendum as needed.       [1] No Known Allergies

## 2025-04-01 NOTE — PROGRESS NOTES
The following individual(s) verbally consented to be recorded using ambient AI listening technology and understand that they can each withdraw their consent to this listening technology at any point by asking the clinician to turn off or pause the recording:    Patient name: Tasneem Geovanny

## 2025-05-16 ENCOUNTER — TELEPHONE (OUTPATIENT)
Dept: FAMILY MEDICINE CLINIC | Facility: CLINIC | Age: 65
End: 2025-05-16

## 2025-05-16 NOTE — TELEPHONE ENCOUNTER
Pt called and needs to get an order for a mammogram. Tried to make an appt for one and since she has a lump in her lt breast she needs an order from Dr. Somers. Please call and advise.

## 2025-05-16 NOTE — TELEPHONE ENCOUNTER
Spoke with pt. Pt states she has a new lump on her left breast since last week. Pt informed that she will need a visit with a provider. Pt stated she will call back to make appt after work.

## 2025-05-20 ENCOUNTER — OFFICE VISIT (OUTPATIENT)
Dept: FAMILY MEDICINE CLINIC | Facility: CLINIC | Age: 65
End: 2025-05-20
Payer: COMMERCIAL

## 2025-05-20 VITALS
OXYGEN SATURATION: 96 % | WEIGHT: 151.38 LBS | HEART RATE: 68 BPM | DIASTOLIC BLOOD PRESSURE: 60 MMHG | RESPIRATION RATE: 16 BRPM | SYSTOLIC BLOOD PRESSURE: 130 MMHG | BODY MASS INDEX: 28.58 KG/M2 | HEIGHT: 61 IN | TEMPERATURE: 98 F

## 2025-05-20 DIAGNOSIS — Z78.0 ASYMPTOMATIC MENOPAUSAL STATE: ICD-10-CM

## 2025-05-20 DIAGNOSIS — Z12.31 VISIT FOR SCREENING MAMMOGRAM: ICD-10-CM

## 2025-05-20 DIAGNOSIS — N63.21 LUMP IN UPPER OUTER QUADRANT OF LEFT BREAST: Primary | ICD-10-CM

## 2025-05-20 PROCEDURE — 99213 OFFICE O/P EST LOW 20 MIN: CPT | Performed by: FAMILY MEDICINE

## 2025-05-20 NOTE — PROGRESS NOTES
Chief Complaint:  Chief Complaint   Patient presents with    Breast Lump     Upper left breast area, no pain.        HPI:  This is a 64 year old female patient presenting for Breast Lump (Upper left breast area, no pain. )    History of Present Illness  Tasneem Small is a 64 year old female with a history of DCIS of the right breast who presents with a new lump in the left breast.    She discovered a lump in the upper outer quadrant of her left breast, which appeared suddenly. There is no associated pain or discomfort. She has a history of DCIS of the right breast, treated with lumpectomy in 2008. The proximity of the lump to her pacemaker, located on the left side, may complicate imaging studies.    She has not yet undergone an MRI. There is a need for separate imaging for the breast and other areas due to the presence of the pacemaker.    Her medical history includes Mobitz type II, status post pacemaker replacement, glaucoma, seizures, and migraines. She takes Lamictal, receives Botox injections, and uses rizatriptan as needed for migraines. For insomnia, she uses THC gummies as needed. Her blood pressure was elevated at her last visit but is normal today.    Health Maintenance:  Health Maintenance   Topic Date Due    Pneumococcal Vaccine: 50+ Years (1 of 1 - PCV) Never done    Zoster Vaccines (1 of 2) Never done    DEXA Scan  12/25/2023    Annual Physical  07/06/2024    COVID-19 Vaccine (2 - 2024-25 season) 09/01/2024    Mammogram  09/07/2024    Tobacco Cessation Counseling  Never done    Influenza Vaccine (Season Ended) 10/01/2025    Colorectal Cancer Screening  11/24/2030    Annual Depression Screening  Completed    Fall Risk Screening (Annual)  Completed    Meningococcal B Vaccine  Aged Out       ROS: Review of systems performed and negative unless stated in HPI.    Past medical, family and social history reviewed and listed as below.    HISTORY:  Past Medical History[1]   Past Surgical History[2]   Family  History[3]   Short Social Hx on File[4]     Current Medications[5]  Allergies:  Allergies[6]    EXAM:  Vitals:    05/20/25 1048   BP: 130/60   BP Location: Right arm   Pulse: 68   Resp: 16   Temp: 98.3 °F (36.8 °C)   TempSrc: Oral   SpO2: 96%   Weight: 151 lb 6.4 oz (68.7 kg)   Height: 5' 1\" (1.549 m)     GENERAL: vitals reviewed and listed above, alert, oriented, appears well hydrated and in no acute distress  HEENT: atraumatic, conjunctiva clear, no obvious abnormalities on inspection   LUNGS: clear to auscultation bilaterally, no wheezes, rales or rhonchi, good air movement  CV: HRRR, no murmurs, no peripheral edema   EXTREMITIES: warm and well perfused  BREAST: lump consistent with pacemaker in L upper outer quadrant with inferior lump  PSYCH: pleasant and cooperative, no obvious depression or anxiety    ASSESSMENT AND PLAN:  Discussed the following assessment   1. Lump in upper outer quadrant of left breast (Primary)  -     JIMMIE KERRI 2D+3D DIAGNOSTIC JIMMIE  BILAT (CPT=77066/56118); Future; Expected date: 05/20/2025  2. Asymptomatic menopausal state  -     Dexa Bone Density (CPT=77080); Future; Expected date: 05/20/2025  3. Visit for screening mammogram      Advised the following:  Assessment & Plan  Lump in left breast  A lump is present in the left upper outer quadrant of the breast. She has a history of DCIS of the right breast treated with lumpectomy in 2008. The lump's proximity to the pacemaker suggests possible scar tissue related to the pacemaker. Differential diagnosis includes benign growth, fat, or malignancy. Additional imaging is necessary for further evaluation.  - Order diagnostic mammogram with specific focus on the lump area  - Order breast ultrasound for further evaluation of the lump  - Schedule mammogram at a location where a radiologist can review the results immediately    DCIS of right breast  DCIS of the right breast was treated with lumpectomy in 2008.    Mobitz type II with pacemaker  She  is status post pacemaker replacement for Mobitz type II.    Seizures  She manages seizures with Lamictal and follows with neurology.    Migraines  She manages migraines with Botox injections and rizatriptan as needed, under neurology care.    Insomnia  She experiences insomnia and uses THC gummies as needed for management.    Glaucoma  She follows with ophthalmology for glaucoma.    Recording duration: 10 minutes    Concerning signs and symptoms that warrant returning to the clinic reviewed and patient demonstrated understanding.    Kathy Somers DO  2025 11:31 AM  Family Medicine    Note to Patient  The  Century Cures Act makes medical notes like these available to patients in the interest of transparency. However, be advised this is a medical document and is intended as vqov-it-wtog communication; it is written in medical language and may appear blunt, direct, or contain abbreviations or verbiage that are unfamiliar. Medical documents are intended to carry relevant information, facts as evident, and the clinical opinion of the practitioner.     This report has been produced using speech recognition software and [employer] technology, and may contain errors related to grammar, punctuation, spelling, words or phrases unrecognized or not translated appropriately to text; these errors may be referred to the dictating provider for further clarification and/or addendum as needed.         [1]   Past Medical History:   Breast CA (HCC)    DCIS    DCIS (ductal carcinoma in situ)    Diverticulosis of large intestine    Ductal carcinoma in situ of breast    Glaucoma    Migraines    Obstructive sleep apnea (adult) (pediatric)    DMG PSG AHI 15 RDI REM AHI 11 SaO2 emiliano 91%    OTHER DISEASES     R Breast DCIS    Seizure disorder (HCC)    last seizure 2017    Sleep apnea    cpap    Visual impairment    glasses   [2]   Past Surgical History:  Procedure Laterality Date          times 1    Cardiac pacemaker  placement      Cholecystectomy  7/23/12 [facility]    Colonoscopy      Colonoscopy N/A 11/24/2020    Procedure: COLONOSCOPY;  Surgeon: Marcellus Joe MD;  Location:  ENDOSCOPY    Hx breast cancer Right 01/01/2008    DCIS    Hysterectomy      Lumpectomy right  8-4-08 [facility]    Lumpectomy right  8-21-08 [facility]    Re-Excision Right Breast Lumpectomy    Needle biopsy left  01/01/2007    Needle biopsy right  2007, 2009    Oral surgery procedure  Age 16    Stereotactic breast biopsy  12-13-07 [facility]    Bilateral    Stereotactic breast biopsy  2-16-09 [facility]    Right Breast 10 o'clock posterior    Tonsillectomy  Age 3   [3]   Family History  Problem Relation Age of Onset    DCIS Self 50        DCIS    Hypertension Mother     Diabetes Mother     Diabetes Father    [4]   Social History  Socioeconomic History    Marital status:     Number of children: 1   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   Tobacco Use    Smoking status: Never    Smokeless tobacco: Never   Vaping Use    Vaping status: Every Day    Substances: THC   Substance and Sexual Activity    Alcohol use: Yes     Comment: couple drinks a month    Drug use: Yes     Types: Cannabis     Comment: nightly    Sexual activity: Yes     Birth control/protection: Hysterectomy   Other Topics Concern    Caffeine Concern Yes     Comment: 1 cup of coffee daily    Exercise Yes     Comment: walking 10,000 steps a day    Self-Exams No     Social Drivers of Health     Food Insecurity: No Food Insecurity (1/31/2025)    Food Insecurity     Food Insecurity: Never true   Transportation Needs: No Transportation Needs (1/31/2025)    Transportation Needs     Lack of Transportation: No   Housing Stability: Low Risk  (1/31/2025)    Housing Stability     Housing Instability: No   [5]   Current Outpatient Medications   Medication Sig Dispense Refill    losartan 25 MG Oral Tab Take 1 tablet (25 mg total) by mouth daily. 90 tablet 0     ondansetron 4 MG Oral Tablet Dispersible Take 1 tablet (4 mg total) by mouth every 4 (four) hours as needed. 30 tablet 0    lamoTRIgine 25 MG Oral Tab Take 4 tablets (100 mg total) by mouth daily. 360 tablet 1    latanoprost 0.005 % Ophthalmic Solution Place 1 drop into both eyes nightly.      HYDROcodone-homatropine (HYDROMET) 5-1.5 MG/5ML Oral Solution Take 2.5 mL by mouth every 8 (eight) hours as needed. 120 mL 0   [6] No Known Allergies

## 2025-05-21 LAB
ABSOLUTE BASOPHILS: 20 CELLS/UL (ref 0–200)
ABSOLUTE EOSINOPHILS: 70 CELLS/UL (ref 15–500)
ABSOLUTE LYMPHOCYTES: 1175 CELLS/UL (ref 850–3900)
ABSOLUTE MONOCYTES: 410 CELLS/UL (ref 200–950)
ABSOLUTE NEUTROPHILS: 3325 CELLS/UL (ref 1500–7800)
ALBUMIN/GLOBULIN RATIO: 1.7 (CALC) (ref 1–2.5)
ALBUMIN: 4.6 G/DL (ref 3.6–5.1)
ALKALINE PHOSPHATASE: 87 U/L (ref 37–153)
ALT: 16 U/L (ref 6–29)
AST: 16 U/L (ref 10–35)
BASOPHILS: 0.4 %
BILIRUBIN, TOTAL: 0.7 MG/DL (ref 0.2–1.2)
BUN: 16 MG/DL (ref 7–25)
CALCIUM: 9.3 MG/DL (ref 8.6–10.4)
CARBON DIOXIDE: 27 MMOL/L (ref 20–32)
CHLORIDE: 106 MMOL/L (ref 98–110)
CHOL/HDLC RATIO: 3.1 (CALC)
CHOLESTEROL, TOTAL: 204 MG/DL
CREATININE: 0.78 MG/DL (ref 0.5–1.05)
EGFR: 84 ML/MIN/1.73M2
EOSINOPHILS: 1.4 %
GLOBULIN: 2.7 G/DL (CALC) (ref 1.9–3.7)
GLUCOSE: 101 MG/DL (ref 65–99)
HDL CHOLESTEROL: 65 MG/DL
HEMATOCRIT: 40.1 % (ref 35–45)
HEMOGLOBIN A1C: 5.8 %
HEMOGLOBIN: 13.1 G/DL (ref 11.7–15.5)
LDL-CHOLESTEROL: 120 MG/DL (CALC)
LYMPHOCYTES: 23.5 %
MCH: 30.2 PG (ref 27–33)
MCHC: 32.7 G/DL (ref 32–36)
MCV: 92.4 FL (ref 80–100)
MONOCYTES: 8.2 %
MPV: 9.8 FL (ref 7.5–12.5)
NEUTROPHILS: 66.5 %
NON-HDL CHOLESTEROL: 139 MG/DL (CALC)
PLATELET COUNT: 228 THOUSAND/UL (ref 140–400)
POTASSIUM: 4.4 MMOL/L (ref 3.5–5.3)
PROTEIN, TOTAL: 7.3 G/DL (ref 6.1–8.1)
RDW: 13.4 % (ref 11–15)
RED BLOOD CELL COUNT: 4.34 MILLION/UL (ref 3.8–5.1)
SODIUM: 141 MMOL/L (ref 135–146)
TRIGLYCERIDES: 86 MG/DL
TSH W/REFLEX TO FT4: 3.55 MIU/L (ref 0.4–4.5)
WHITE BLOOD CELL COUNT: 5 THOUSAND/UL (ref 3.8–10.8)

## 2025-06-06 ENCOUNTER — HOSPITAL ENCOUNTER (OUTPATIENT)
Dept: BONE DENSITY | Age: 65
Discharge: HOME | End: 2025-06-06
Attending: FAMILY MEDICINE
Payer: COMMERCIAL

## 2025-06-06 DIAGNOSIS — Z78.0 ASYMPTOMATIC MENOPAUSAL STATE: ICD-10-CM

## 2025-06-06 PROCEDURE — 77080 DXA BONE DENSITY AXIAL: CPT | Performed by: FAMILY MEDICINE

## 2025-06-17 ENCOUNTER — HOSPITAL ENCOUNTER (OUTPATIENT)
Dept: MAMMOGRAPHY | Facility: HOSPITAL | Age: 65
Discharge: HOME | End: 2025-06-17
Attending: FAMILY MEDICINE
Payer: COMMERCIAL

## 2025-06-17 DIAGNOSIS — N63.21 LUMP IN UPPER OUTER QUADRANT OF LEFT BREAST: ICD-10-CM

## 2025-06-17 PROCEDURE — 77062 BREAST TOMOSYNTHESIS BI: CPT | Performed by: FAMILY MEDICINE

## 2025-06-17 PROCEDURE — 77066 DX MAMMO INCL CAD BI: CPT | Performed by: FAMILY MEDICINE

## 2025-06-17 PROCEDURE — 76642 ULTRASOUND BREAST LIMITED: CPT | Performed by: FAMILY MEDICINE

## 2025-06-17 NOTE — IMAGING NOTE
This Breast Care RN assisted Dr. JONATHAN Lewis with recommendation for a left breast 1 site ultrasound guided biopsy for mass.  Procedure reviewed and all questions answered. Emotional and educational support given. On the day of the biopsy, pt instructed to take Tylenol 1000mg PO, eat a light meal & bring or wear a sports bra.  Post biopsy care also reviewed with pt to include NO lifting more than 5lbs, no exercising or housework (limit upper body movement) for 24-48 hrs post biopsy. Patient denies blood thinners, bleeding disorders, liver disease, and chemo.  Pt verbalized understanding.  Our breast center schedulers will be calling to schedule an appt that is convenient for pt.

## 2025-06-23 ENCOUNTER — HOSPITAL ENCOUNTER (OUTPATIENT)
Dept: MAMMOGRAPHY | Facility: HOSPITAL | Age: 65
Discharge: HOME | End: 2025-06-23
Attending: FAMILY MEDICINE
Payer: COMMERCIAL

## 2025-06-23 DIAGNOSIS — N63.0 BREAST MASS: ICD-10-CM

## 2025-06-23 DIAGNOSIS — R92.8 ABNORMAL FINDINGS ON DIAGNOSTIC IMAGING OF BREAST: ICD-10-CM

## 2025-06-23 PROCEDURE — 88305 TISSUE EXAM BY PATHOLOGIST: CPT | Performed by: FAMILY MEDICINE

## 2025-06-23 PROCEDURE — 77065 DX MAMMO INCL CAD UNI: CPT | Performed by: FAMILY MEDICINE

## 2025-06-23 PROCEDURE — 88342 IMHCHEM/IMCYTCHM 1ST ANTB: CPT | Performed by: FAMILY MEDICINE

## 2025-06-23 PROCEDURE — 88344 IMHCHEM/IMCYTCHM EA MLT ANTB: CPT | Performed by: FAMILY MEDICINE

## 2025-06-23 PROCEDURE — 19083 BX BREAST 1ST LESION US IMAG: CPT | Performed by: FAMILY MEDICINE

## 2025-06-23 PROCEDURE — 88360 TUMOR IMMUNOHISTOCHEM/MANUAL: CPT | Performed by: FAMILY MEDICINE

## 2025-06-23 PROCEDURE — 88341 IMHCHEM/IMCYTCHM EA ADD ANTB: CPT | Performed by: FAMILY MEDICINE

## 2025-06-23 NOTE — DISCHARGE INSTRUCTIONS
Discharge Instructions  Stereotactic / Ultrasound / MRI Core Biopsy     Place an ice pack on top of the biopsy site in your bra OR the folds of the Ace wrap for 10-15 minutes every hour until bedtime for your comfort and to decrease bleeding.     Keep your supportive bra OR the Ace wrap in place for 24 hours after your biopsy. This compression decreases bleeding and breast movement for your comfort. Wear a supportive bra for the next couple days for comfort (as well as for sleeping)     No bath or shower during the first 24 hours after biopsy. After this time, you may take a bath or shower. It’s okay if the steri-strips get wet but don’t soak them.   No saunas, hot tubs, or swimming pools until the steri-strips fall off (5-7days).  This prevents infection and allows time for the area to completely close and heal.     DO NOT remove the steri-strips. They will fall off in 5 days to two weeks. If any type of irritation (redness, itching, OR blisters) develops in the area around the steri-strips, remove them gently. Keep the area clean and dry.     It is normal to have mild discomfort and bruising at the biopsy site.      You may take Tylenol as needed for discomfort.     DO NOT participate in strenuous activity (aerobics, heavy lifting, housework, gardening, etc.) 48 hours after your biopsy to prevent bleeding.     You will receive results typically within 2-3 business days. Occasionally, the specimen is sent off-site for a 2nd opinion. You will be notified when this occurs as this will delay your results.     If you have any questions about the procedure or your results, please contact the Breast Care Coordinator Nurse at [phone]. (M-F 7:30-4)    If you are having a MRI Breast Biopsy or an Ultrasound guided biopsy, you will be billed for the biopsy and a unilateral mammogram separately.    A 6-month or one year follow-up Diagnostic Mammogram/Ultrasound will be recommended to document stability of the biopsy  site. It may be scheduled at [facility] or [facility] by calling [phone]. You will need an order for this exam from your referring physician.       5/2024

## 2025-06-24 NOTE — IMAGING NOTE
1324- Pt called and LVM instructing to arrive 30 minutes prior to scheduled appointment, park in the [facility], check in at the outpatient registration desk, bring copy of insurance card/photo ID, and leave all valuables/jewelry at home.  Provided radiology RN callback number if pt has any questions.

## 2025-06-25 ENCOUNTER — HOSPITAL ENCOUNTER (OUTPATIENT)
Dept: MRI IMAGING | Facility: HOSPITAL | Age: 65
Discharge: HOME | End: 2025-06-25
Attending: FAMILY MEDICINE
Payer: COMMERCIAL

## 2025-06-25 ENCOUNTER — TELEPHONE (OUTPATIENT)
Dept: MAMMOGRAPHY | Facility: HOSPITAL | Age: 65
End: 2025-06-25

## 2025-06-25 VITALS — HEART RATE: 63 BPM | OXYGEN SATURATION: 99 % | SYSTOLIC BLOOD PRESSURE: 135 MMHG | DIASTOLIC BLOOD PRESSURE: 60 MMHG

## 2025-06-25 DIAGNOSIS — K86.2 PANCREATIC CYST (HCC): ICD-10-CM

## 2025-06-25 PROCEDURE — 76376 3D RENDER W/INTRP POSTPROCES: CPT | Performed by: FAMILY MEDICINE

## 2025-06-25 PROCEDURE — 74183 MRI ABD W/O CNTR FLWD CNTR: CPT | Performed by: FAMILY MEDICINE

## 2025-06-25 PROCEDURE — A9575 INJ GADOTERATE MEGLUMI 0.1ML: HCPCS

## 2025-06-25 RX ORDER — GADOTERATE MEGLUMINE 376.9 MG/ML
15 INJECTION INTRAVENOUS
Status: COMPLETED | OUTPATIENT
Start: 2025-06-25 | End: 2025-06-25

## 2025-06-25 RX ADMIN — GADOTERATE MEGLUMINE 14 ML: 376.9 INJECTION INTRAVENOUS at 14:55:00

## 2025-06-25 NOTE — TELEPHONE ENCOUNTER
Telephoned Tasneem Small and name,  verified with patient. Notified Tasneem Small of left breast positive for IDC biopsy result. Concordance pending. Tasneem Small reports biopsy site is healing well. Radiologist recommends surgical/ oncology consultations. Per Robby RN at Dr Somers's office patient is being referred to Dr Rios or Dr Rodgers. Patient provided with the contact information for the surgeons, the Breast Program Navigators, and myself. Patient accepted first available appt with Dr Rios on  at 9 am. Patient also accepted an appt with Dr Paredes on  at 10 am. Pt verbalized understanding and had no further questions at this time.

## 2025-06-25 NOTE — IMAGING NOTE
Assumed care of Tasneem in MRI holding, name, , allergies verified; IV access established, patient placed in MRI-safe scan setting by Delmy Allne.    Pt tolerated MRI scan w/ no immediate complications noted -- please see flow-sheets in Epic for vital signs and/or additional information.    Pt escorted to MRI holding room post scan by radiology RN and [employer] tech. IV removed, pacemaker reset to previous setting by [employer] rep. Tasneem then escorted to dressing room by [employer] tech.

## 2025-06-25 NOTE — TELEPHONE ENCOUNTER
Called and spoke to patient. Not a good time to talk, Patient states that she is about to have an MRI. Patient will call us back re: breast pathology results and recommendations.

## 2025-06-26 ENCOUNTER — TELEPHONE (OUTPATIENT)
Age: 65
End: 2025-06-26

## 2025-06-26 NOTE — TELEPHONE ENCOUNTER
Voicemail left for patient to call back. Calling to introduce myself as one of the navigators.  Number left for her to call back.

## 2025-06-26 NOTE — CONSULTS
Breast Surgery New Patient Consultation    Tasneem Small is a 64 year old patient, referred by Dr. Somers, who presents for evaluation of left breast cancer.    History of Present Illness:   Ms. Tasneem Small is a 64 year old woman with a past history significant for diverticulosis (s/p LAR ), pacemaker placement, hysterectomy, and DCIS at age 48 who presents for evaluation of left breast cancer.     She hurt her left arm and at that time felt a left breast mass. She thinks she first noticed it around beginning of 2025.     She underwent diagnostic mammogram  on 2025. She has density C breast tissue. There was a focal asymmetry in the upper outer quadrant of the left breast that corresponded to the palpable abnormality. Ultrasound showed a 3.1 x 3.0 x 2.9 cm mass at the 2:00 position, 8 cm from the nipple. Left axillary ultrasound was normal. Biopsy was performed on 2025 and a dragonfly clip was placed. Pathology showed invasive ductal carcinoma, grade 3. The tumor is triple negative.     She has a history of right breast DCIS and is s/p lumpectomy in . She did not undergo radiation and did not take \"any medication\" after that. Her surgeon was Dr. Marcellus Joe. She had a normal mammogram in 2023.     She denies any nipple discharge, skin changes, or axillary adenopathy. She does not have breast pain.  She does not have family history of breast cancer. The patient does not have a history of genetic testing.          Past Medical History[1]    Past Surgical History[2]    Gynecological History:  Menarche at age 11 and LMP   Pt is a   She denies any cumulative breastfeeding  Age of Menopause: around age 48  Type: unknown  She denies any history of hormone replacement therapy , OCP use, or infertility treatment    Medications:    Current Medications[3]    Allergies:    Allergies[4]    Family History:   Family History[5]    She is not of Ashkenazi Islam ancestry.    Social  History:  History   Alcohol Use    Yes     Comment: couple drinks a month       History   Smoking Status    Never   Smokeless Tobacco    Never       Review of Systems:    Review of Systems   Constitutional:  Negative for chills and fever.   HENT:  Negative for sore throat and trouble swallowing.    Eyes:  Negative for visual disturbance.   Respiratory:  Negative for cough, chest tightness and shortness of breath.    Cardiovascular:  Negative for chest pain, palpitations and leg swelling.   Gastrointestinal:  Negative for nausea, vomiting, abdominal pain, diarrhea, constipation and blood in stool.   Genitourinary:  Negative for dysuria, hematuria and difficulty urinating.   Skin:  Negative for rash.   Neurological:  Negative for numbness and headaches.      Otherwise as per HPI.    Physical Exam:    /74 (BP Location: Left arm, Patient Position: Sitting)   Pulse 72   Wt 68 kg (150 lb)   LMP 01/06/2008   SpO2 97%   BMI 28.34 kg/m²     Physical Exam  Vitals reviewed.   Constitutional:       Appearance: Normal appearance.   HENT:      Head: Normocephalic and atraumatic.   Eyes:      Extraocular Movements: Extraocular movements intact.      Pupils: Pupils are equal, round, and reactive to light.   Cardiovascular:      Rate and Rhythm: Normal rate.   Pulmonary:      Effort: Pulmonary effort is normal.   Chest:   Breasts:     Right: Normal. No mass, nipple discharge, skin change or tenderness.      Left: Mass present. No nipple discharge, skin change or tenderness.          Comments: Left breast: 4 x 5 cm mass, biopsy site  Well healed right breast incision  Abdominal:      General: Abdomen is flat.      Palpations: Abdomen is soft.   Musculoskeletal:         General: Normal range of motion.      Cervical back: Normal range of motion and neck supple.   Lymphadenopathy:      Upper Body:      Right upper body: No supraclavicular or axillary adenopathy.      Left upper body: No supraclavicular or axillary  adenopathy.   Skin:     General: Skin is warm and dry.   Neurological:      General: No focal deficit present.      Mental Status: She is alert and oriented to person, place, and time.   Psychiatric:         Mood and Affect: Mood normal.          Bra size: 36D (M)    Labs/imaging: reviewed in EPIC    Impression:   Ms. Tasneem Small is a 64 year old woman that presents for triple negative left breast cancer (mG2D0S5), clinical anatomic stage 2A and prognostic stage 2B.    Discussion and Plan:  I had a discussion with the Patient and her friend regarding the breast exam. On exam today I found a 4 x 5 cm mass in the upper outer quadrant of the left breast and biopsy site changes. She has a well healed incision in the upper outer quadrant of the right breast from her previous lumpectomy. I personally reviewed her recent imaging and pathology and we discussed this.      I explained in brief the natural history of breast cancer, the different types of cancer and how breast cancers typically progress.  I told the patient that breast cancers typically spread to axillary lymph nodes and then to distant sites.  I told her that important prognostic factors in breast cancer are the size of the tumor, status of the axillary nodes, and receptor status.  I explained that most breast cancers are surgically removed first and then given adjuvant radiation and/or chemohormonal therapy.  In this scenario, she will be offered chemo hormonal therapy first.  The medical oncologist will decide on the regimen and timing of the treatment.  This will help shrink her tumor burden, kill any cancer cells that have escaped from the breast, and also gives some information as to how the tumor responds to chemotherapy.      We discussed that MRI would be valuable at this time to identify the extent of disease as well as evaluate the contralateral breast. I discussed that this test could lead to further testing or biopsies.  We also discussed her  genetics risk with her personal and family history and I would recommend genetics counseling. We discussed the implications of seeing genetics now versus after her surgery and at this time she would consider prophylactic mastectomies if positive for a pathogenic variant.     Surgical treatment options were discussed in detail with the patient. These include breast conservation or mastectomy. We discussed that there are randomized clinical trials that have conclusively established the equivalency of survival between these two approaches when breast conservation is combined with radiation.  She does have a pacemaker, which could complicate radiation.  Nevertheless, she may need radiation with mastectomy as well.    If she is a candidate for lumpectomy after chemotherapy, she understands the importance of histologic margin analysis.  She understands that sometimes reexcision or completion mastectomy are required.  We discussed that lumpectomy has a shorter recovery, does not require drain placement, and does require radiation. If she undergoes lumpectomy, she would need a preoperative seed localization procedure.  In her case, I would ask for a biopsy of the satellite lesion prior to starting chemotherapy if she is interested in lumpectomy.  It is important to remove the extent of disease, we discussed this.    As an alternative, I have offered the option of a mastectomy.  Her breast to tumor ratio, at this point, suggest mastectomy is the best option.  We discussed that her tumor may shrink with chemotherapy.  We also discussed that there are some patients that require radiation after mastectomy.  Mastectomy can be unilateral or bilateral and often times this is a personal preference.  Her imaging so far does not suggest any disease in the contralateral breast, but this could change based on MRI results. We discussed, in brief, reconstruction options associated with mastectomy.  After chemotherapy, she would like to  consult with plastic surgery to hear more detail about reconstructive options.      In brief, we discussed that radiation therapy entails several weeks of daily radiation which is determined by a radiation oncologist. Side effects we discussed include skin reaction and fatigue.     I also recommended a sentinel lymph node biopsy. I explained the procedure of sentinel lymph node biopsy, the risks and the benefits, and the need for preoperative lymphoscintigraphy in the nuclear medicine department prior to surgery to locate the node. Blue dye will be injected in the operating room while the patient is asleep to help locate the node.     We discussed that either surgery would be performed as an outpatient. We discussed the low risk of infection and bleeding. This patient does not have significant medical history to increase these risks. She is aware of the risk to have to return to the operating room for hematoma or positive margins. She is aware of postoperative seroma risk. We discussed the general timing of surgery, return to work, lifting restrictions, recovery time, and postoperative follow up.     Her case was discussed in multidisciplinary tumor board on 7/1.       Additional work up needed:   Genetics: She qualifies for genetics as her DCIS was diagnosed at <50 years old  MRI: Would be beneficial, patient has pacemaker and this will be delayed until 7/30/2025  Plastic surgery consultation: Patient would be interested in reconstruction options    Surgery:   Patient is leaning toward bilateral mastectomy at this time, but is still undecided and may want to have lumpectomy.  We will follow-up with the patient on this upcoming Monday to see what she has settled on      Systemic therapy:  Chemotherapy: Per medical oncology  Anti-estrogen therapy: Not applicable due to the triple negative tumor    Radiation:   To be determined    She was given ample opportunity for questions and those questions were answered to her  satisfaction. She has been encouraged to contact the office with any questions or concerns. This encounter lasted a total of 55 minutes, more than 50% of which was dedicated to the discussion of management options.     Silas Rios MD  Breast Surgical Oncology    CC: Dr. Somers            [1]   Past Medical History:   Breast CA (HCC)    DCIS    DCIS (ductal carcinoma in situ)    Diverticulosis of large intestine    Ductal carcinoma in situ of breast    Glaucoma    Migraines    Obstructive sleep apnea (adult) (pediatric)    DMG PSG AHI 15 RDI REM AHI 11 SaO2 emiliano 91%    OTHER DISEASES     R Breast DCIS    Seizure disorder (HCC)    last seizure 2017    Sleep apnea    cpap    Visual impairment    glasses   [2]   Past Surgical History:  Procedure Laterality Date          times 1    Cardiac pacemaker placement      Cholecystectomy  [address]    Colonoscopy      Colonoscopy N/A 2020    Procedure: COLONOSCOPY;  Surgeon: Marcellus Joe MD;  Location:  ENDOSCOPY    Hx breast cancer Right 2008    DCIS    Hysterectomy      Lumpectomy right  [address] [facility]    Lumpectomy right  [facility]    Re-Excision Right Breast Lumpectomy    Needle biopsy left  2007    Needle biopsy right  ,     Oral surgery procedure  Age 16    Stereotactic breast biopsy  [address] [facility]    Bilateral    Stereotactic breast biopsy  [address] [facility]    Right Breast 10 o'clock posterior    Tonsillectomy  Age 3   [3]    [DISCONTINUED] losartan 25 MG Oral Tab Take 1 tablet (25 mg total) by mouth daily. 90 tablet 0    lamoTRIgine 25 MG Oral Tab Take 4 tablets (100 mg total) by mouth daily. 360 tablet 1    latanoprost 0.005 % Ophthalmic Solution Place 1 drop into both eyes nightly.     [4] No Known Allergies  [5]   Family History  Problem Relation Age of Onset    Diabetes Father     Hypertension Mother     Diabetes Mother     DCIS Self 48        DCIS    Cancer Paternal Aunt

## 2025-06-28 DIAGNOSIS — I10 PRIMARY HYPERTENSION: ICD-10-CM

## 2025-06-30 ENCOUNTER — TELEPHONE (OUTPATIENT)
Age: 65
End: 2025-06-30

## 2025-06-30 NOTE — TELEPHONE ENCOUNTER
Phoned patient and we discussed newly diagnosed breast cancer. Introduced myself as one of the breast nurse navigators and explained the role of the breast nurse navigator. Explained the role of the physicians on her breast cancer care team. Reviewed over pathology report and discussed the type of breast cancer, grade, and ER/WI and Her 2. Briefly discussed breast cancer treatments including surgery, radiation, hormone therapy, and chemotherapy. Explained the breast cancer multidisciplinary meeting and that her case will be discussed. Discussed the breast cancer treatment handbook and at their upcoming appointment this resource will be provided. Offered her a sooner appointment with Dr. Davis for tomorrow, Tuesday July 1, 2025 at 1000 in [place] and she is scheduled with Dr. Rios, breast surgeon, on the same day at 0900 in [place]. Patient given my contact information to call with any further questions or concerns.

## 2025-07-01 ENCOUNTER — TELEPHONE (OUTPATIENT)
Age: 65
End: 2025-07-01

## 2025-07-01 ENCOUNTER — OFFICE VISIT (OUTPATIENT)
Dept: SURGERY | Facility: CLINIC | Age: 65
End: 2025-07-01
Payer: COMMERCIAL

## 2025-07-01 ENCOUNTER — OFFICE VISIT (OUTPATIENT)
Age: 65
End: 2025-07-01
Attending: SPECIALIST
Payer: COMMERCIAL

## 2025-07-01 ENCOUNTER — NURSE NAVIGATOR ENCOUNTER (OUTPATIENT)
Age: 65
End: 2025-07-01

## 2025-07-01 VITALS
HEART RATE: 72 BPM | BODY MASS INDEX: 28 KG/M2 | SYSTOLIC BLOOD PRESSURE: 136 MMHG | WEIGHT: 150 LBS | OXYGEN SATURATION: 97 % | DIASTOLIC BLOOD PRESSURE: 74 MMHG

## 2025-07-01 VITALS
BODY MASS INDEX: 28.51 KG/M2 | WEIGHT: 151 LBS | SYSTOLIC BLOOD PRESSURE: 153 MMHG | HEIGHT: 60.95 IN | OXYGEN SATURATION: 97 % | DIASTOLIC BLOOD PRESSURE: 75 MMHG | TEMPERATURE: 98 F | RESPIRATION RATE: 16 BRPM | HEART RATE: 66 BPM

## 2025-07-01 DIAGNOSIS — Z79.899 ENCOUNTER FOR MONITORING CARDIOTOXIC DRUG THERAPY: Primary | ICD-10-CM

## 2025-07-01 DIAGNOSIS — C50.412 MALIGNANT NEOPLASM OF UPPER-OUTER QUADRANT OF LEFT BREAST IN FEMALE, ESTROGEN RECEPTOR NEGATIVE (HCC): ICD-10-CM

## 2025-07-01 DIAGNOSIS — Z17.1 MALIGNANT NEOPLASM OF UPPER-OUTER QUADRANT OF LEFT BREAST IN FEMALE, ESTROGEN RECEPTOR NEGATIVE (HCC): Primary | ICD-10-CM

## 2025-07-01 DIAGNOSIS — Z17.1 MALIGNANT NEOPLASM OF UPPER-OUTER QUADRANT OF LEFT BREAST IN FEMALE, ESTROGEN RECEPTOR NEGATIVE (HCC): ICD-10-CM

## 2025-07-01 DIAGNOSIS — C50.412 MALIGNANT NEOPLASM OF UPPER-OUTER QUADRANT OF LEFT BREAST IN FEMALE, ESTROGEN RECEPTOR NEGATIVE (HCC): Primary | ICD-10-CM

## 2025-07-01 DIAGNOSIS — C50.912 INVASIVE DUCTAL CARCINOMA OF LEFT BREAST (HCC): Primary | ICD-10-CM

## 2025-07-01 DIAGNOSIS — E55.9 VITAMIN D DEFICIENCY: ICD-10-CM

## 2025-07-01 DIAGNOSIS — Z51.81 ENCOUNTER FOR MONITORING CARDIOTOXIC DRUG THERAPY: Primary | ICD-10-CM

## 2025-07-01 LAB
ALBUMIN SERPL-MCNC: 4.7 G/DL (ref 3.2–4.8)
ALBUMIN/GLOB SERPL: 1.7 {RATIO} (ref 1–2)
ALP LIVER SERPL-CCNC: 77 U/L (ref 55–142)
ALT SERPL-CCNC: 23 U/L (ref 10–49)
ANION GAP SERPL CALC-SCNC: 8 MMOL/L (ref 0–18)
AST SERPL-CCNC: 19 U/L (ref ?–34)
BASOPHILS # BLD AUTO: 0.03 X10(3) UL (ref 0–0.2)
BASOPHILS NFR BLD AUTO: 0.5 %
BILIRUB SERPL-MCNC: 0.6 MG/DL (ref 0.2–1.1)
BRCA1 C.185DELAG BLD/T QL: 10.7 U/ML (ref ?–38)
BUN BLD-MCNC: 20 MG/DL (ref 9–23)
CALCIUM BLD-MCNC: 9.4 MG/DL (ref 8.7–10.6)
CANCER AG15-3 SERPL-ACNC: 4.1 U/ML (ref ?–32.4)
CEA SERPL-MCNC: <0.5 NG/ML (ref ?–5)
CHLORIDE SERPL-SCNC: 108 MMOL/L (ref 98–112)
CO2 SERPL-SCNC: 26 MMOL/L (ref 21–32)
CREAT BLD-MCNC: 0.79 MG/DL (ref 0.55–1.02)
EGFRCR SERPLBLD CKD-EPI 2021: 82 ML/MIN/1.73M2 (ref 60–?)
EOSINOPHIL # BLD AUTO: 0.04 X10(3) UL (ref 0–0.7)
EOSINOPHIL NFR BLD AUTO: 0.6 %
ERYTHROCYTE [DISTWIDTH] IN BLOOD BY AUTOMATED COUNT: 13.4 %
GLOBULIN PLAS-MCNC: 2.8 G/DL (ref 2–3.5)
GLUCOSE BLD-MCNC: 105 MG/DL (ref 70–99)
HCT VFR BLD AUTO: 38.3 % (ref 35–48)
HGB BLD-MCNC: 12.8 G/DL (ref 12–16)
IMM GRANULOCYTES # BLD AUTO: 0.02 X10(3) UL (ref 0–1)
IMM GRANULOCYTES NFR BLD: 0.3 %
LYMPHOCYTES # BLD AUTO: 1.21 X10(3) UL (ref 1–4)
LYMPHOCYTES NFR BLD AUTO: 18.3 %
MCH RBC QN AUTO: 30.3 PG (ref 26–34)
MCHC RBC AUTO-ENTMCNC: 33.4 G/DL (ref 31–37)
MCV RBC AUTO: 90.5 FL (ref 80–100)
MONOCYTES # BLD AUTO: 0.35 X10(3) UL (ref 0.1–1)
MONOCYTES NFR BLD AUTO: 5.3 %
NEUTROPHILS # BLD AUTO: 4.96 X10 (3) UL (ref 1.5–7.7)
NEUTROPHILS # BLD AUTO: 4.96 X10(3) UL (ref 1.5–7.7)
NEUTROPHILS NFR BLD AUTO: 75 %
OSMOLALITY SERPL CALC.SUM OF ELEC: 297 MOSM/KG (ref 275–295)
PLATELET # BLD AUTO: 222 10(3)UL (ref 150–450)
POTASSIUM SERPL-SCNC: 4.2 MMOL/L (ref 3.5–5.1)
PROT SERPL-MCNC: 7.5 G/DL (ref 5.7–8.2)
RBC # BLD AUTO: 4.23 X10(6)UL (ref 3.8–5.3)
SODIUM SERPL-SCNC: 142 MMOL/L (ref 136–145)
VIT D+METAB SERPL-MCNC: 15.2 NG/ML (ref 30–100)
WBC # BLD AUTO: 6.6 X10(3) UL (ref 4–11)

## 2025-07-01 PROCEDURE — 99205 OFFICE O/P NEW HI 60 MIN: CPT | Performed by: SURGERY

## 2025-07-01 RX ORDER — LOSARTAN POTASSIUM 25 MG/1
25 TABLET ORAL DAILY
Qty: 90 TABLET | Refills: 1 | Status: SHIPPED | OUTPATIENT
Start: 2025-07-01

## 2025-07-01 NOTE — PROGRESS NOTES
[facility] Consultation Note      Patient Name: Tasneem Small   YOB: 1960  Medical Record Number: [id number]  Consulting Physician: Nikolai Davis M.D.   Referring Physician: Kathy Somers,      The  Cures Act makes medical notes like these available to patients in the interest of transparency. Please be advised this is a medical document. Medical documents are intended to carry relevant information, facts as evident, and the clinical opinion of the practitioner. The medical note is intended as peer to peer communication and may appear blunt or direct. It is written in medical language and may contain abbreviations or verbiage that are unfamiliar.      Date of Consultation: 2025      Reason for Consultation (Chief Complaint)  Invasive ductal carcinoma, left breast.     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     On 2025 she underwent bilateral diagnostic mammography and left breast ultrasound for a self palpated left breast mass that showed a 3.1 cm mass at the 2 o'clock position in the left breast and an adjacent 0.5 cm mass.     On 2025 she underwent ultrasound guided biopsy of the left breast mass. Pathology showed grade 3 invasive ductal carcinoma. Immunohistochemical studies were as follows: estrogen receptor <1%, progesterone receptor <1%, Her2 1+, Ki67 50%. She was staged as T2N0M0.     Performance Status   Karnofsky 100% - Normal, no complaints.     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     Past Surgical History   Right breast lumpectomy; ; cardiac pacemaker placement (for bradycardia); cholecystectomy; hysterectomy; oral surgery;  tonsillectomy.     Family History   No known family history of malignancy.     Social History   Daily cannabis user (vaping).       Current Medications   [DISCONTINUED] losartan 25 MG Oral Tab Take 1 tablet (25 mg total) by mouth daily. 90 tablet 0    lamoTRIgine 25 MG Oral Tab Take 4 tablets (100 mg total) by mouth daily. 360 tablet 1    latanoprost 0.005 % Ophthalmic Solution Place 1 drop into both eyes nightly.       Allergies   Ms. Small has no known allergies.       Vital Signs   /75 (BP Location: Left arm, Patient Position: Sitting, Cuff Size: adult)   Pulse 66   Temp 98.3 °F (36.8 °C) (Temporal)   Resp 16   Ht 1.548 m (5' 0.95\")   Wt 68.5 kg (151 lb)   LMP 01/06/2008   SpO2 97%   BMI 28.58 kg/m²     Physical Examination   Constitutional  Well developed and well nourished; in no apparent distress.  Head   Normocephalic and atraumatic.  Eyes   Conjunctiva clear; sclera anicteric.  ENMT   External nose normal; external ears normal.  Respiratory  Normal effort; no respiratory distress; clear to auscultation bilaterally.   Cardiovascular  Regular rate and rhythm; normal S1S2.   Abdomen  Not distended.   Neurologic  Motor and sensory grossly intact.  Psychiatric  Mood and affect appropriate.      Laboratory   Recent Results (from the past 48 hours)   CBC W/DIFF [E]    Collection Time: 07/01/25 11:31 AM   Result Value Ref Range    WBC 6.6 4.0 - 11.0 x10(3) uL    RBC 4.23 3.80 - 5.30 x10(6)uL    HGB 12.8 12.0 - 16.0 g/dL    HCT 38.3 35.0 - 48.0 %    .0 150.0 - 450.0 10(3)uL    MCV 90.5 80.0 - 100.0 fL    MCH 30.3 26.0 - 34.0 pg    MCHC 33.4 31.0 - 37.0 g/dL    RDW 13.4 %    Neutrophil Absolute Prelim 4.96 1.50 - 7.70 x10 (3) uL    Neutrophil Absolute 4.96 1.50 - 7.70 x10(3) uL    Lymphocyte Absolute 1.21 1.00 - 4.00 x10(3) uL    Monocyte Absolute 0.35 0.10 - 1.00 x10(3) uL    Eosinophil Absolute 0.04 0.00 - 0.70 x10(3) uL    Basophil Absolute 0.03 0.00 - 0.20 x10(3) uL    Immature Granulocyte  Absolute 0.02 0.00 - 1.00 x10(3) uL    Neutrophil % 75.0 %    Lymphocyte % 18.3 %    Monocyte % 5.3 %    Eosinophil % 0.6 %    Basophil % 0.5 %    Immature Granulocyte % 0.3 %   COMP METABOLIC PANEL [E]    Collection Time: 07/01/25 11:31 AM   Result Value Ref Range    Glucose 105 (H) 70 - 99 mg/dL    Sodium 142 136 - 145 mmol/L    Potassium 4.2 3.5 - 5.1 mmol/L    Chloride 108 98 - 112 mmol/L    CO2 26.0 21.0 - 32.0 mmol/L    Anion Gap 8 0 - 18 mmol/L    BUN 20 9 - 23 mg/dL    Creatinine 0.79 0.55 - 1.02 mg/dL    Calcium, Total 9.4 8.7 - 10.6 mg/dL    Calculated Osmolality 297 (H) 275 - 295 mOsm/kg    eGFR-Cr 82 >=60 mL/min/1.73m2    AST 19 <34 U/L    ALT 23 10 - 49 U/L    Alkaline Phosphatase 77 55 - 142 U/L    Bilirubin, Total 0.6 0.2 - 1.1 mg/dL    Total Protein 7.5 5.7 - 8.2 g/dL    Albumin 4.7 3.2 - 4.8 g/dL    Globulin  2.8 2.0 - 3.5 g/dL    A/G Ratio 1.7 1.0 - 2.0    Patient Fasting for CMP? Patient not present    CA 27.29 [E]    Collection Time: 07/01/25 11:31 AM   Result Value Ref Range    Breast Carcinoma Ag [id number] 10.7 <38.0 u/mL   CANCER ANTIGEN (CA) 15-3 [E]    Collection Time: 07/01/25 11:31 AM   Result Value Ref Range    Cancer Ag 15-3 4.1 <=32.4 U/mL   CEA [E]    Collection Time: 07/01/25 11:31 AM   Result Value Ref Range    CEA  <0.5 <=5.0 ng/mL   Vitamin D, 25-Hydroxy    Collection Time: 07/01/25 11:31 AM   Result Value Ref Range    Vitamin D, 25OH, Total 15.2 (L) 30.0 - 100.0 ng/mL     Impression and Plan   1.   Invasive ductal carcinoma, left breast: Patient is staged as T2N0M0. Her tumor is estrogen and progesterone receptor negative and Her2 1+.           I recommend neoadjuvant systemic therapy with carboplatin/paclitaxel/pembrolizumab -> doxorubicin/cyclophosphamide/pembrolizumab. I reviewed the curative goals of therapy and the potential adverse effects including, but not limited to, nausea, vomiting, alopecia, myelosuppression, peripheral neuropathy, cardiomyopathy, hemorrhagic cystitis,  MDS/AML, and immune mediated toxicities. Patient expressed understanding.           Patient has seen Dr. Rios and breast MRI has been ordered. I recommend evaluation by genetic counseling for germline genetic testing.           Prior to the start of therapy the following should be completed: Echocardiogram with strain; evaluation by cardio-oncology; single lumen port-a-cath placement by IR; chemotherapy education.     2.   Vitamin D deficiency: I recommend high dose replacement; a prescription was sent to her pharmacy.     3.   Emotional well being: Patient's emotional well being was assessed.  No issues requiring acute psychosocial intervention were identified.    Planned Follow Up   As above.     Encounter Time  Pre-charting/reviewing medical records: 10 minutes.  In room with patient obtaining history, performing exam, counseling on diagnosis, and reviewing plan: 60 minutes.  Orders/notes: 10 minutes.  Total time: 80 minutes.    Electronically Signed by:     Nikolai Davis M.D.  System Medical Director, Oncology Services  [facility]

## 2025-07-01 NOTE — TELEPHONE ENCOUNTER
Called the patient to inform her about the appointments I helped schedule for her for her plan of care. Stated to the patient that the breast MRI has not been scheduled yet due to awaiting for Dr. Rios's response about proceeding or not with having the MRI scheduled on July 30, 2025 due to the patient having a pacemaker. Model #[id number], serial # [id number], for her pacemaker. Provided echo on Wednesday July 9, 2025 at 1515 at [facility]. Scheduled cardiologist, Dr. Diaz, for chemotherapy clearance on July 14, 2025 at 0830 in [place]. Stated that we will schedule her chemotherapy once the MRI is scheduled or discontinued. She stated she would prefer Thursdays with Dr. Davis for her chemotherapy days. Instructed patient that the interventional radiology department will contact her to schedule for the chemotherapy. She thanked me for the phone call and assistance.

## 2025-07-01 NOTE — PROGRESS NOTES
Patient is here for MD consult for Breast cancer. Left breast biopsy completed on 6/23. Patient met with surg/onc today to discuss surgical options. Here to discuss next steps.       Education Record    Learner:  Patient    Disease / Diagnosis:  Breast cancer     Barriers / Limitations:  None   Comments:    Method:  Discussion   Comments:    General Topics:  Plan of care reviewed   Comments:    Outcome:  Shows understanding   Comments:

## 2025-07-01 NOTE — H&P (VIEW-ONLY)
[facility]ematology Oncology Group Consultation Note      Patient Name: Tasneem Small   YOB: 1960  Medical Record Number: [id number]  Consulting Physician: Nikolai Davis M.D.   Referring Physician: Kathy Somers,      The  Cures Act makes medical notes like these available to patients in the interest of transparency. Please be advised this is a medical document. Medical documents are intended to carry relevant information, facts as evident, and the clinical opinion of the practitioner. The medical note is intended as peer to peer communication and may appear blunt or direct. It is written in medical language and may contain abbreviations or verbiage that are unfamiliar.      Date of Consultation: 2025      Reason for Consultation (Chief Complaint)  Invasive ductal carcinoma, left breast.     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     On 2025 she underwent bilateral diagnostic mammography and left breast ultrasound for a self palpated left breast mass that showed a 3.1 cm mass at the 2 o'clock position in the left breast and an adjacent 0.5 cm mass.     On 2025 she underwent ultrasound guided biopsy of the left breast mass. Pathology showed grade 3 invasive ductal carcinoma. Immunohistochemical studies were as follows: estrogen receptor <1%, progesterone receptor <1%, Her2 1+, Ki67 50%. She was staged as T2N0M0.     Performance Status   Karnofsky 100% - Normal, no complaints.     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     Past Surgical History   Right breast lumpectomy; ; cardiac pacemaker placement (for bradycardia); cholecystectomy; hysterectomy; oral surgery;  tonsillectomy.     Family History   No known family history of malignancy.     Social History   Daily cannabis user (vaping).       Current Medications   [DISCONTINUED] losartan 25 MG Oral Tab Take 1 tablet (25 mg total) by mouth daily. 90 tablet 0    lamoTRIgine 25 MG Oral Tab Take 4 tablets (100 mg total) by mouth daily. 360 tablet 1    latanoprost 0.005 % Ophthalmic Solution Place 1 drop into both eyes nightly.       Allergies   Ms. Small has no known allergies.       Vital Signs   /75 (BP Location: Left arm, Patient Position: Sitting, Cuff Size: adult)   Pulse 66   Temp 98.3 °F (36.8 °C) (Temporal)   Resp 16   Ht 1.548 m (5' 0.95\")   Wt 68.5 kg (151 lb)   LMP 01/06/2008   SpO2 97%   BMI 28.58 kg/m²     Physical Examination   Constitutional  Well developed and well nourished; in no apparent distress.  Head   Normocephalic and atraumatic.  Eyes   Conjunctiva clear; sclera anicteric.  ENMT   External nose normal; external ears normal.  Respiratory  Normal effort; no respiratory distress; clear to auscultation bilaterally.   Cardiovascular  Regular rate and rhythm; normal S1S2.   Abdomen  Not distended.   Neurologic  Motor and sensory grossly intact.  Psychiatric  Mood and affect appropriate.      Laboratory   Recent Results (from the past 48 hours)   CBC W/DIFF [E]    Collection Time: 07/01/25 11:31 AM   Result Value Ref Range    WBC 6.6 4.0 - 11.0 x10(3) uL    RBC 4.23 3.80 - 5.30 x10(6)uL    HGB 12.8 12.0 - 16.0 g/dL    HCT 38.3 35.0 - 48.0 %    .0 150.0 - 450.0 10(3)uL    MCV 90.5 80.0 - 100.0 fL    MCH 30.3 26.0 - 34.0 pg    MCHC 33.4 31.0 - 37.0 g/dL    RDW 13.4 %    Neutrophil Absolute Prelim 4.96 1.50 - 7.70 x10 (3) uL    Neutrophil Absolute 4.96 1.50 - 7.70 x10(3) uL    Lymphocyte Absolute 1.21 1.00 - 4.00 x10(3) uL    Monocyte Absolute 0.35 0.10 - 1.00 x10(3) uL    Eosinophil Absolute 0.04 0.00 - 0.70 x10(3) uL    Basophil Absolute 0.03 0.00 - 0.20 x10(3) uL    Immature Granulocyte  Absolute 0.02 0.00 - 1.00 x10(3) uL    Neutrophil % 75.0 %    Lymphocyte % 18.3 %    Monocyte % 5.3 %    Eosinophil % 0.6 %    Basophil % 0.5 %    Immature Granulocyte % 0.3 %   COMP METABOLIC PANEL [E]    Collection Time: 07/01/25 11:31 AM   Result Value Ref Range    Glucose 105 (H) 70 - 99 mg/dL    Sodium 142 136 - 145 mmol/L    Potassium 4.2 3.5 - 5.1 mmol/L    Chloride 108 98 - 112 mmol/L    CO2 26.0 21.0 - 32.0 mmol/L    Anion Gap 8 0 - 18 mmol/L    BUN 20 9 - 23 mg/dL    Creatinine 0.79 0.55 - 1.02 mg/dL    Calcium, Total 9.4 8.7 - 10.6 mg/dL    Calculated Osmolality 297 (H) 275 - 295 mOsm/kg    eGFR-Cr 82 >=60 mL/min/1.73m2    AST 19 <34 U/L    ALT 23 10 - 49 U/L    Alkaline Phosphatase 77 55 - 142 U/L    Bilirubin, Total 0.6 0.2 - 1.1 mg/dL    Total Protein 7.5 5.7 - 8.2 g/dL    Albumin 4.7 3.2 - 4.8 g/dL    Globulin  2.8 2.0 - 3.5 g/dL    A/G Ratio 1.7 1.0 - 2.0    Patient Fasting for CMP? Patient not present    CA 27.29 [E]    Collection Time: 07/01/25 11:31 AM   Result Value Ref Range    Breast Carcinoma Ag [id number] 10.7 <38.0 u/mL   CANCER ANTIGEN (CA) 15-3 [E]    Collection Time: 07/01/25 11:31 AM   Result Value Ref Range    Cancer Ag 15-3 4.1 <=32.4 U/mL   CEA [E]    Collection Time: 07/01/25 11:31 AM   Result Value Ref Range    CEA  <0.5 <=5.0 ng/mL   Vitamin D, 25-Hydroxy    Collection Time: 07/01/25 11:31 AM   Result Value Ref Range    Vitamin D, 25OH, Total 15.2 (L) 30.0 - 100.0 ng/mL     Impression and Plan   1.   Invasive ductal carcinoma, left breast: Patient is staged as T2N0M0. Her tumor is estrogen and progesterone receptor negative and Her2 1+.           I recommend neoadjuvant systemic therapy with carboplatin/paclitaxel/pembrolizumab -> doxorubicin/cyclophosphamide/pembrolizumab. I reviewed the curative goals of therapy and the potential adverse effects including, but not limited to, nausea, vomiting, alopecia, myelosuppression, peripheral neuropathy, cardiomyopathy, hemorrhagic cystitis,  MDS/AML, and immune mediated toxicities. Patient expressed understanding.           Patient has seen Dr. Rios and breast MRI has been ordered. I recommend evaluation by genetic counseling for germline genetic testing.           Prior to the start of therapy the following should be completed: Echocardiogram with strain; evaluation by cardio-oncology; single lumen port-a-cath placement by IR; chemotherapy education.     2.   Vitamin D deficiency: I recommend high dose replacement; a prescription was sent to her pharmacy.     3.   Emotional well being: Patient's emotional well being was assessed.  No issues requiring acute psychosocial intervention were identified.    Planned Follow Up   As above.     Encounter Time  Pre-charting/reviewing medical records: 10 minutes.  In room with patient obtaining history, performing exam, counseling on diagnosis, and reviewing plan: 60 minutes.  Orders/notes: 10 minutes.  Total time: 80 minutes.    Electronically Signed by:     Nikolai Davis M.D.  System Medical Director, Oncology Services  [facility]

## 2025-07-01 NOTE — PATIENT INSTRUCTIONS
Dr. Rios team will contact you on Monday 7/7 to see what you are thinking about lumpectomy vs. Mastectomy  Continue care with medical oncology   Working on getting MRI

## 2025-07-01 NOTE — PROGRESS NOTES
Met with patient and friend in Dr. Rios's clinic. Introduced myself as one the breast nurse navigators and explained the role of the breast nurse navigator and coordination of care. Explained the role of all of the physicians involved in her care including the surgeon, medical oncologist, radiation oncologist, and possibly plastic surgeon. Reviewed over the patients pathology report and discussed receptors including ER/CT/ and Her 2. Patient was given the breast cancer treatment handbook and reviewed over how to use this resource. Discussed the breast multidisciplinary conference and that her case was discussed. Patient was given the contact information for the social workers at the [facility] and resources for support as requested. Patient provided with the Forms department contact information for leave of absence paperwork completion, if applicable. Breast cancer journey map provided to patient and discussed possible Oncotype, if applicable, and other cancer treatments such as chemotherapy and radiation. Provided lumpectomy surgical sheet. Next step in care will be to schedule breast MRI, schedule genetics appointment, and await for further orders from Dr. Davis. Walked patient to her appointment with Dr. Davis at the [facility].     Patient was provided with breast nurse navigator contact information and was encouraged to phone with any other questions or concerns.

## 2025-07-02 ENCOUNTER — TELEPHONE (OUTPATIENT)
Age: 65
End: 2025-07-02

## 2025-07-02 DIAGNOSIS — E55.9 VITAMIN D DEFICIENCY: Primary | ICD-10-CM

## 2025-07-02 RX ORDER — ERGOCALCIFEROL 1.25 MG/1
50000 CAPSULE, LIQUID FILLED ORAL WEEKLY
Qty: 8 CAPSULE | Refills: 0 | Status: SHIPPED | OUTPATIENT
Start: 2025-07-02

## 2025-07-02 RX ORDER — ERGOCALCIFEROL 1.25 MG/1
50000 CAPSULE, LIQUID FILLED ORAL WEEKLY
Qty: 8 CAPSULE | Refills: 0 | Status: SHIPPED | OUTPATIENT
Start: 2025-07-02 | End: 2025-07-02

## 2025-07-02 NOTE — TELEPHONE ENCOUNTER
Test(s) completed: Vitamin D    Results: 15.2- low    Plan: start weekly Vitamin D for 8 weeks. Rx sent to [facility] pharmacy. All other labs were fine. Patient notified.

## 2025-07-02 NOTE — PAT NURSING NOTE
PreOp Instructions     You are scheduled for: an Interventional Radiology Procedure     Date of Procedure: 07/16/25. CHECK IN AT 8:00 AM     Diet Instructions: Do not eat or drink anything after midnight including gum, mints, candy, etc.     Medications: Medications you are allowed to take can be taken with a sip of water the morning of your procedure     Skin Prep : Shower with antibacterial soap using a clean washcloth, prior to procedure. Once dried off, no lotions/powders/creams/ointments, etc.     Driving After Procedure: Sedation will be given so you WILL NOT be able to drive home. You will need a responsible adult  to drive you home. You can NOT take uber or taxi unless approved by your physician in advance.     Discharge Teaching: Your nurse will give you specific instructions before discharge, Most people can resume normal activities in 2-3 days, Any questions, please call the physician's office

## 2025-07-07 ENCOUNTER — OFFICE VISIT (OUTPATIENT)
Age: 65
End: 2025-07-07
Attending: SPECIALIST
Payer: COMMERCIAL

## 2025-07-07 ENCOUNTER — TELEPHONE (OUTPATIENT)
Age: 65
End: 2025-07-07

## 2025-07-07 ENCOUNTER — TELEPHONE (OUTPATIENT)
Dept: SURGERY | Facility: CLINIC | Age: 65
End: 2025-07-07

## 2025-07-07 DIAGNOSIS — Z71.89 OTHER SPECIFIED COUNSELING: Primary | ICD-10-CM

## 2025-07-07 DIAGNOSIS — Z17.1 MALIGNANT NEOPLASM OF UPPER-OUTER QUADRANT OF LEFT BREAST IN FEMALE, ESTROGEN RECEPTOR NEGATIVE (HCC): ICD-10-CM

## 2025-07-07 DIAGNOSIS — C50.412 MALIGNANT NEOPLASM OF UPPER-OUTER QUADRANT OF LEFT BREAST IN FEMALE, ESTROGEN RECEPTOR NEGATIVE (HCC): ICD-10-CM

## 2025-07-07 RX ORDER — PROCHLORPERAZINE MALEATE 10 MG
10 TABLET ORAL EVERY 6 HOURS PRN
Qty: 30 TABLET | Refills: 3 | Status: SHIPPED | OUTPATIENT
Start: 2025-07-07

## 2025-07-07 RX ORDER — ONDANSETRON 8 MG/1
8 TABLET, FILM COATED ORAL EVERY 8 HOURS PRN
Qty: 30 TABLET | Refills: 3 | Status: SHIPPED | OUTPATIENT
Start: 2025-07-07

## 2025-07-07 NOTE — TELEPHONE ENCOUNTER
Called patient to discuss surgical biopsy.  She will start with chemo, but I wanted to touch base about lumpectomy versus mastectomy.  Patient elects for bilateral mastectomy.  We discussed that she will meet with me again towards the end of chemo and will undergo additional imaging to check for response.  She will also need a plastic surgery appointment toward the end of chemo as she would like to have reconstruction.    Silas Rios MD  Breast Surgical Oncology

## 2025-07-07 NOTE — PROGRESS NOTES
IV Cancer Therapy Education    Patient:Tasneem Small     Date: 7/7/25   Diagnosis:  Breast cancer    Support person(s) present: No    Drug names:  Carboplatin/Paclitaxel/Doxorubicin/Cyclophosphamide/Keytruda    Myelosuppression  Decrease in wbc  Decrease in hgb  Decrease in platelets  Risk of infection  Fatigue  Risk of bleeding  Notify MD/RN of any chills or fever 100.5and above:     Gastrointestinal Toxicities:  Stomatitis, sensitivity or oropharyngeal membranes, dry mouth, thrush  Appropriate oral hygiene  Changes in taste perception   Loss of appetite, possible weightloss  Nausea and vomiting   Use of anti-nausea medications  Diarrhea   Use of Imodium  Constipation  Use of various laxatives      Treatment Effects on Hair:  Alopecia or thinning      Neurotoxicity:  Peripheral neuropathy      Cardiotoxicity  Risk of cardiomyopathy    Hepatotoxicity  Rise in LFTs    Nephrotoxicity  Rise in serum creatinine  Dehydration  Electrolyte abnormalities    Dermatologic toxicity:  Nail changes        Vesicants / Irritants:  Potential extravasation at site of administration that may result in skin/tissue damage  To Notify MD/RN IMMEDIATELY  any of the above signs / symptoms occur         Immunotherapy  Education     Immunotherapy action on cancer / normal cells:       Lung: pneumonitis, cough, shortness of breath       Gastrointestinal (Colitis): Diarrhea, Mucus, blood      Liver (Hepatitis): Yellowing of skin or eyes, Nausea or vomiting, abdominal pain on right, dark urine, excessive bruising or bleeding      Hormone Gland (Thyroid, Pituitary, Adrenal and pancreas): rapid heart rate, weight loss or gain, increased sweating, feeling more hungry or thirsty, urinating more frequently, hair loss, feeling cold, constipation, deeper voice, muscle aches, dizziness or fainting, headache       Kidney Problems (Nephritis): change in amount or color of urine     Problems in other organs: Rash, change in eyesight, severe or  persistent muscle or joint pains, severe muscle weakness, low red blood cell (Anemia)      Infusion Reactions: Chills, shaking, shortness of breath or wheezing, itching or rash, flushing, dizziness, fever, feeling like passing out          Teaching Materials Provided:      Immunotherapy information sheets   Chemotherapy information sheets  ACS Nutrition for the Person with Cancer during Treatment / Dietician information sheet  When to contact the Treatment Team Information Sheet  Side Effect Management Information Sheet  [facility] Support Services Sheet  National Resources Information sheet         Patient and caregiver were given ample opportunity to ask questions. All questions and concerns addressed. We discussed self care techniques, symptom management, fluid, diet, and activities. Patient verbalized understanding of above information provided.      Cancer Therapy  Consent Form signed by the patient.  I spent 60 minutes counseling patient regarding the above documented side effects and management, when to call provider and contact information.      Patient and caregiver were given ample opportunity to ask questions.  All questions and concerns addressed.  We discussed self care techniques, symptom management, fluid, diet, and activities.       Encounter Times  PreChartin minutes    Reviewing/Obtaining:   minutes      Medical Exam:   minutes    Plan:   minutes      Notes: 10 minutes    Counseling/Education: 60 minutes      Referring/Communicating:   minutes    Ind Interpretation:   minutes      Care Coordination:   minutes       My total time spent caring for the patient on the day of the encounter: 74 minutes.         Mali MONTIEL  Nurse Practitioner  [facility]

## 2025-07-07 NOTE — TELEPHONE ENCOUNTER
Called patient to discuss scheduling chemotherapy after she discussed her decision for bilateral mastectomy with reconstruction with Dr. Rios. She stated she wanted to have chemotherapy on Thursdays with Dr. Davis. Per patient request, scheduled chemotherapy at the [facility] on Thursday July 24, 2025. She thanked me for the phone call and assistance.

## 2025-07-08 ENCOUNTER — APPOINTMENT (OUTPATIENT)
Facility: LOCATION | Age: 65
End: 2025-07-08
Attending: INTERNAL MEDICINE

## 2025-07-08 ENCOUNTER — NURSE ONLY (OUTPATIENT)
Age: 65
End: 2025-07-08
Attending: SPECIALIST
Payer: COMMERCIAL

## 2025-07-08 ENCOUNTER — OFFICE VISIT (OUTPATIENT)
Age: 65
End: 2025-07-08
Attending: SPECIALIST
Payer: COMMERCIAL

## 2025-07-08 DIAGNOSIS — C50.912 MALIGNANT NEOPLASM OF LEFT FEMALE BREAST, UNSPECIFIED ESTROGEN RECEPTOR STATUS, UNSPECIFIED SITE OF BREAST (HCC): Primary | ICD-10-CM

## 2025-07-08 NOTE — PROGRESS NOTES
Referring Provider:  Nikolai Davis MD     Additional Provider(s):  MD Kathy Penaloza DO    Reason for Referral:  Tasneem Small was referred for genetic counseling because of a new diagnosis of breast cancer. Ms. Small is a 64 year-old woman of [place] descent who was diagnosed with an invasive ductal carcinoma of the left breast on 25. Her reported medical history is notable for ER/AZ-positive DCIS of the right breast diagnosed at age 47 that was treated with lumpectomy. Ms. Small declined radiation and Tamoxifen/AI. Ms. Samll reports that she had a melanoma removed from her left leg in the early s. Ms. Small had a hysterectomy at age 47 because of uterine fibroids and abnormal bleeding; it is unclear if her ovaries are intact. Ms. Small's 2020 colonoscopy was negative for polyps but incomplete due to diverticulosis.    Social History:  Ms. Small was seen today by herself. Ms. Small lives in [place].     Family History:   A three generation pedigree was obtained.      Ms. Small has a 33 year-old son.     Ms. Small has one younger sister and no brothers.      Ms. Small's mother is 93 years-old and had not had cancer. Ms. Small's mother had three brothers and one sister, none of whom have a known history of cancer. Ms. Small's maternal grandmother  at age 92 and did not have cancer. Ms. Small's maternal grandfather  at age 89 and did not have cancer.     Ms. Small's father  in his 80s from throat cancer. Ms. Small's father had one brother and one sister. Ms. Small's paternal aunt  at age 52 from uterine cancer. Ms. Small's paternal grandmother  in her early 60s and did not have cancer. Ms. Small's paternal grandfather  at an unspecified older age and did not have cancer.     Please see the pedigree for additional family history information.     Counseling:   The following information was discussed with  Ms. Small.    Genetics of Cancer:  The majority of cancers are sporadic whereas approximately 10-30% of cases of cancer cases are attributed to familial factors such as unidentified low penetrance genes in the family or shared environmental factors.  Approximately 5-10% of cancers are related to a hereditary cancer syndrome.  Signs of a hereditary cancer syndrome include some rare cancers, common cancers occurring at unusually young ages, multiple primary cancers in the some individual, or the same type of cancer or related cancers (e.g., breast and ovarian, colorectal and endometrial) in three or more individuals in the same lineage.      Risk Assessment:   Ms. Small meets NCCN Guidelines testing criteria for high-penetrance breast cancer susceptibility genes based on her personal history of DCIS at age 47 and contralateral breast cancer at age 64. Risk assessment to estimate the chance of Ms. Small harboring a BRCA1/2 pathogenic variant was done by using the Arnie II Model. Based on this model, Ms. Small's risk of carrying a BRCA1/2 pathogenic variant is estimated to be 8%. It is important to note that all prediction models have limitations and medical management should be based on clinical judgment, and personal and family history. In addition, there are no prediction models or testing criteria for moderate penetrance cancer predisposition genes such as SANTI and CHEK2. I recommend that testing be performed as part of a multigene panel.     Genetic Testing (Panel):  The pros, cons, and limitations of genetic testing were discussed including the potential implications of test results on clinical management.     If a pathogenic variant is not identified (negative result), it is still possible that Ms. Small has a pathogenic variant in one of these genes that was not detected by the genetic test, or that the family is dealing with a hereditary cancer syndrome involving a different gene. It is also  possible that Ms. Small's relatives have a pathogenic variant in one of these genes that Ms. Small did not inherit. In this scenario, options for cancer screening/management should be determined according to personal and family histories and should be discussed with a physician.      A variant of uncertain significance is a DNA change that may or may not alter the function of the gene; therefore, it is usually not possible to determine if the gene variant is responsible for an individual's increased cancer risk.     If Ms. Small is found to carry a pathogenic variant in a cancer predisposition gene, she is at significantly increased risk for various cancers. The magnitude of these risks, and the cancers for which she is at increased risk would depend on the gene involved. Medical recommendations for individuals with BRCA1/2 pathogenic variants were reviewed as an example. It was also explained that for some of the genes for which testing is available, the associated cancer risks have yet to be determined and medical management recommendations may not yet be available for individuals with pathogenic variants in these genes. If she were to test positive for a pathogenic variant, her son and sister would each have a 50% chance of carrying the same variant. At-risk adults (>18) would have the option of pursuing targeted genetic testing to clarify their cancer risks. Genetic test results have implications for the entire biological family. Thus, it is recommended that she share her genetic test results with her biological family members so that they may have their risk assessed.     Genetic Information Non-Discrimination Act:  The legal protections of the Genetic Information Nondiscrimination Act ([place]LVARO) for health insurance and employment were discussed.  [place] does not provide protection for life insurance, disability or long-term care insurance.    Summary and Plan:  Ms. Small was referred for genetic  counseling because of a new diagnosis of breast cancer at age 64 and a history of contralateral breast cancer at age 47. Genetic testing on Ms. Small for breast cancer susceptibility genes is indicated.      At the conclusion of the counseling session Ms. Small decided to proceed with genetic testing. Written consent was obtained. Blood and paperwork were sent to [facility] for their [facility] Breast Cancer STAT panel (SANTI, BRCA1, BRCA2, CDH1, CHEK2, PALB2, PTEN, STK11, and TP53) with auto reflex. I anticipate that Ms. Small's Breast STAT results will be available within 6-13 days and will call her with the results. Results will also be communicated to Dr. Davis and Dr. Somers.    Approximately 55 minutes was spent in coordination of Ms. Small's care.

## 2025-07-09 ENCOUNTER — HOSPITAL ENCOUNTER (OUTPATIENT)
Dept: CV DIAGNOSTICS | Facility: HOSPITAL | Age: 65
Discharge: HOME | End: 2025-07-09
Attending: SPECIALIST
Payer: COMMERCIAL

## 2025-07-09 DIAGNOSIS — Z51.81 ENCOUNTER FOR MONITORING CARDIOTOXIC DRUG THERAPY: ICD-10-CM

## 2025-07-09 DIAGNOSIS — Z79.899 ENCOUNTER FOR MONITORING CARDIOTOXIC DRUG THERAPY: ICD-10-CM

## 2025-07-09 DIAGNOSIS — C50.412 MALIGNANT NEOPLASM OF UPPER-OUTER QUADRANT OF LEFT BREAST IN FEMALE, ESTROGEN RECEPTOR NEGATIVE (HCC): ICD-10-CM

## 2025-07-09 DIAGNOSIS — Z17.1 MALIGNANT NEOPLASM OF UPPER-OUTER QUADRANT OF LEFT BREAST IN FEMALE, ESTROGEN RECEPTOR NEGATIVE (HCC): ICD-10-CM

## 2025-07-09 PROCEDURE — 93306 TTE W/DOPPLER COMPLETE: CPT | Performed by: SPECIALIST

## 2025-07-14 ENCOUNTER — APPOINTMENT (OUTPATIENT)
Dept: CARDIOLOGY | Age: 65
End: 2025-07-14

## 2025-07-14 VITALS
HEART RATE: 75 BPM | HEIGHT: 61 IN | DIASTOLIC BLOOD PRESSURE: 78 MMHG | WEIGHT: 153.2 LBS | BODY MASS INDEX: 28.92 KG/M2 | OXYGEN SATURATION: 97 % | SYSTOLIC BLOOD PRESSURE: 122 MMHG

## 2025-07-14 DIAGNOSIS — Z08 ENCOUNTER FOR FOLLOW-UP EXAMINATION AFTER COMPLETED TREATMENT FOR MALIGNANT NEOPLASM: ICD-10-CM

## 2025-07-14 DIAGNOSIS — Z08 VAGINAL PAP SMEAR FOLLOWING HYSTERECTOMY FOR MALIGNANCY: Primary | ICD-10-CM

## 2025-07-14 DIAGNOSIS — Z90.710 VAGINAL PAP SMEAR FOLLOWING HYSTERECTOMY FOR MALIGNANCY: Primary | ICD-10-CM

## 2025-07-14 PROCEDURE — 99205 OFFICE O/P NEW HI 60 MIN: CPT | Performed by: INTERNAL MEDICINE

## 2025-07-14 RX ORDER — ERGOCALCIFEROL 1.25 MG/1
1.25 CAPSULE, LIQUID FILLED ORAL
COMMUNITY
Start: 2025-07-02

## 2025-07-14 RX ORDER — LAMOTRIGINE 25 MG/1
100 TABLET ORAL
COMMUNITY
Start: 2025-01-20

## 2025-07-14 RX ORDER — LOSARTAN POTASSIUM 25 MG/1
25 TABLET ORAL DAILY
COMMUNITY
Start: 2025-04-01

## 2025-07-14 RX ORDER — LATANOPROST 50 UG/ML
1 SOLUTION/ DROPS OPHTHALMIC NIGHTLY
COMMUNITY

## 2025-07-14 SDOH — HEALTH STABILITY: PHYSICAL HEALTH: ON AVERAGE, HOW MANY MINUTES DO YOU ENGAGE IN EXERCISE AT THIS LEVEL?: 30 MIN

## 2025-07-14 SDOH — HEALTH STABILITY: PHYSICAL HEALTH: ON AVERAGE, HOW MANY DAYS PER WEEK DO YOU ENGAGE IN MODERATE TO STRENUOUS EXERCISE (LIKE A BRISK WALK)?: 4 DAYS

## 2025-07-14 ASSESSMENT — PATIENT HEALTH QUESTIONNAIRE - PHQ9
CLINICAL INTERPRETATION OF PHQ2 SCORE: NO FURTHER SCREENING NEEDED
SUM OF ALL RESPONSES TO PHQ9 QUESTIONS 1 AND 2: 0
1. LITTLE INTEREST OR PLEASURE IN DOING THINGS: NOT AT ALL
SUM OF ALL RESPONSES TO PHQ9 QUESTIONS 1 AND 2: 0
2. FEELING DOWN, DEPRESSED OR HOPELESS: NOT AT ALL

## 2025-07-16 ENCOUNTER — HOSPITAL ENCOUNTER (OUTPATIENT)
Dept: INTERVENTIONAL RADIOLOGY/VASCULAR | Facility: HOSPITAL | Age: 65
Discharge: HOME | End: 2025-07-16
Attending: SPECIALIST | Admitting: SPECIALIST
Payer: COMMERCIAL

## 2025-07-16 VITALS
TEMPERATURE: 98 F | OXYGEN SATURATION: 98 % | DIASTOLIC BLOOD PRESSURE: 92 MMHG | SYSTOLIC BLOOD PRESSURE: 136 MMHG | HEART RATE: 76 BPM | RESPIRATION RATE: 15 BRPM

## 2025-07-16 DIAGNOSIS — C50.412 MALIGNANT NEOPLASM OF UPPER-OUTER QUADRANT OF LEFT BREAST IN FEMALE, ESTROGEN RECEPTOR NEGATIVE (HCC): ICD-10-CM

## 2025-07-16 DIAGNOSIS — Z17.1 MALIGNANT NEOPLASM OF UPPER-OUTER QUADRANT OF LEFT BREAST IN FEMALE, ESTROGEN RECEPTOR NEGATIVE (HCC): ICD-10-CM

## 2025-07-16 LAB
INR CARTRIDGE LOT #: NORMAL
INR: 1.2 (ref 0.8–1.3)

## 2025-07-16 PROCEDURE — 99152 MOD SED SAME PHYS/QHP 5/>YRS: CPT | Performed by: STUDENT IN AN ORGANIZED HEALTH CARE EDUCATION/TRAINING PROGRAM

## 2025-07-16 PROCEDURE — 36561 INSERT TUNNELED CV CATH: CPT | Performed by: STUDENT IN AN ORGANIZED HEALTH CARE EDUCATION/TRAINING PROGRAM

## 2025-07-16 PROCEDURE — 85610 PROTHROMBIN TIME: CPT | Performed by: STUDENT IN AN ORGANIZED HEALTH CARE EDUCATION/TRAINING PROGRAM

## 2025-07-16 PROCEDURE — 76937 US GUIDE VASCULAR ACCESS: CPT | Performed by: STUDENT IN AN ORGANIZED HEALTH CARE EDUCATION/TRAINING PROGRAM

## 2025-07-16 PROCEDURE — 77001 FLUOROGUIDE FOR VEIN DEVICE: CPT | Performed by: STUDENT IN AN ORGANIZED HEALTH CARE EDUCATION/TRAINING PROGRAM

## 2025-07-16 RX ORDER — MIDAZOLAM HYDROCHLORIDE 1 MG/ML
INJECTION INTRAMUSCULAR; INTRAVENOUS
Status: COMPLETED
Start: 2025-07-16 | End: 2025-07-16

## 2025-07-16 RX ORDER — LIDOCAINE HYDROCHLORIDE 10 MG/ML
INJECTION, SOLUTION INFILTRATION; PERINEURAL
Status: COMPLETED
Start: 2025-07-16 | End: 2025-07-16

## 2025-07-16 RX ORDER — CEFAZOLIN SODIUM 1 G/3ML
INJECTION, POWDER, FOR SOLUTION INTRAMUSCULAR; INTRAVENOUS
Status: COMPLETED
Start: 2025-07-16 | End: 2025-07-16

## 2025-07-16 RX ORDER — LIDOCAINE HYDROCHLORIDE AND EPINEPHRINE BITARTRATE 20; .01 MG/ML; MG/ML
INJECTION, SOLUTION SUBCUTANEOUS
Status: COMPLETED
Start: 2025-07-16 | End: 2025-07-16

## 2025-07-16 RX ORDER — SODIUM CHLORIDE 9 MG/ML
INJECTION, SOLUTION INTRAVENOUS CONTINUOUS
Status: DISCONTINUED | OUTPATIENT
Start: 2025-07-16 | End: 2025-07-16

## 2025-07-16 RX ORDER — HEPARIN SODIUM 5000 [USP'U]/ML
INJECTION, SOLUTION INTRAVENOUS; SUBCUTANEOUS
Status: COMPLETED
Start: 2025-07-16 | End: 2025-07-16

## 2025-07-16 RX ADMIN — SODIUM CHLORIDE: 9 INJECTION, SOLUTION INTRAVENOUS at 08:30:00

## 2025-07-16 NOTE — PLAN OF CARE
Patient had PAC implant today with Dr. Lewis. Right upper chest and neck dressings in place, CDI. VSS. Patient denies any pain. Patient tolerating po intake. Recovery time completed. Discharge instructions reviewed. IV D/C'd. Patient discharged to [facility] by wheelchair with belongings. Patient's friend is .

## 2025-07-16 NOTE — INTERVAL H&P NOTE
The above referenced H&P was reviewed by Nydia Lewis MD on 7/16/2025, the patient was examined and no significant changes have occurred in the patient's condition since the H&P was performed.  Risks, benefits, alternative treatments and consequences of no treatment were discussed.  We will proceed with procedure as planned.  Mallampati: 2  Cardiac: Regular rate  Respiratory: Non-labored  ASA: 3      Nydia Lewis MD  7/16/2025  10:00 AM

## 2025-07-16 NOTE — PROCEDURES
[facility]   part of [facility]  Procedure Note    Tasneem Small Patient Status:  Outpatient    1960 MRN [id number]   Location [facility] INTERVENTIONAL SUITES Attending Nikolai Davis MD   Hosp Day # 0 PCP Kathy Somers DO     Procedure: Port catheter placement    Pre-Procedure Diagnosis:  Left breast ca    Post-Procedure Diagnosis: Same    Anesthesia:  Local and Sedation    Findings:  Patent right internal jugular vein.  Successful placement of right chest port catheter via right internal jugular vein.  Port aspirates and flushes freely. Locked with Heparin.    Port is ready for use.      Specimens: None    Blood Loss:  <5ml    Tourniquet Time: 0  Complications:  None  Drains:  None    Secondary Diagnosis:  None      Nydia Lewis MD  2025

## 2025-07-16 NOTE — DISCHARGE INSTRUCTIONS
Follow up with MDs as scheduled.   Rest today and resume regular activity in the am as tolerated.   Resume diet and medications.   No lifting more than 10 pounds for 48hrs.   Keep bandage completely dry and intact. Bandaid can be removed tomorrow. Leave large brown bandage on for 5 days. On day 5, you may remove dressing and shower.  Notify MD if any signs of infection... Fever, chills, redness, swelling or drainage.

## 2025-07-24 ENCOUNTER — GENETICS ENCOUNTER (OUTPATIENT)
Dept: HEMATOLOGY/ONCOLOGY | Facility: HOSPITAL | Age: 65
End: 2025-07-24

## 2025-07-24 ENCOUNTER — OFFICE VISIT (OUTPATIENT)
Facility: LOCATION | Age: 65
End: 2025-07-24
Attending: SPECIALIST
Payer: COMMERCIAL

## 2025-07-24 ENCOUNTER — NURSE ONLY (OUTPATIENT)
Facility: LOCATION | Age: 65
End: 2025-07-24
Attending: SPECIALIST
Payer: COMMERCIAL

## 2025-07-24 ENCOUNTER — SOCIAL WORK SERVICES (OUTPATIENT)
Facility: LOCATION | Age: 65
End: 2025-07-24

## 2025-07-24 VITALS
SYSTOLIC BLOOD PRESSURE: 146 MMHG | OXYGEN SATURATION: 97 % | TEMPERATURE: 98 F | HEIGHT: 60.87 IN | WEIGHT: 153.19 LBS | BODY MASS INDEX: 28.92 KG/M2 | HEART RATE: 76 BPM | RESPIRATION RATE: 16 BRPM | DIASTOLIC BLOOD PRESSURE: 77 MMHG

## 2025-07-24 DIAGNOSIS — C50.412 MALIGNANT NEOPLASM OF UPPER-OUTER QUADRANT OF LEFT BREAST IN FEMALE, ESTROGEN RECEPTOR NEGATIVE (HCC): Primary | ICD-10-CM

## 2025-07-24 DIAGNOSIS — Z17.1 MALIGNANT NEOPLASM OF UPPER-OUTER QUADRANT OF LEFT BREAST IN FEMALE, ESTROGEN RECEPTOR NEGATIVE (HCC): Primary | ICD-10-CM

## 2025-07-24 DIAGNOSIS — Z51.12 ENCOUNTER FOR ANTINEOPLASTIC IMMUNOTHERAPY: ICD-10-CM

## 2025-07-24 DIAGNOSIS — Z79.69 ON ANTINEOPLASTIC CHEMOTHERAPY: ICD-10-CM

## 2025-07-24 DIAGNOSIS — E55.9 VITAMIN D DEFICIENCY: ICD-10-CM

## 2025-07-24 LAB
ALBUMIN SERPL-MCNC: 4.3 G/DL (ref 3.2–4.8)
ALBUMIN/GLOB SERPL: 1.5 {RATIO} (ref 1–2)
ALP LIVER SERPL-CCNC: 70 U/L (ref 55–142)
ALT SERPL-CCNC: 25 U/L (ref 10–49)
ANION GAP SERPL CALC-SCNC: 7 MMOL/L (ref 0–18)
AST SERPL-CCNC: 21 U/L (ref ?–34)
BASOPHILS # BLD AUTO: 0.03 X10(3) UL (ref 0–0.2)
BASOPHILS NFR BLD AUTO: 0.5 %
BILIRUB SERPL-MCNC: 0.7 MG/DL (ref 0.2–1.1)
BUN BLD-MCNC: 17 MG/DL (ref 9–23)
CALCIUM BLD-MCNC: 9.1 MG/DL (ref 8.7–10.6)
CHLORIDE SERPL-SCNC: 106 MMOL/L (ref 98–112)
CO2 SERPL-SCNC: 28 MMOL/L (ref 21–32)
CREAT BLD-MCNC: 0.83 MG/DL (ref 0.55–1.02)
EGFRCR SERPLBLD CKD-EPI 2021: 78 ML/MIN/1.73M2 (ref 60–?)
EOSINOPHIL # BLD AUTO: 0.08 X10(3) UL (ref 0–0.7)
EOSINOPHIL NFR BLD AUTO: 1.3 %
ERYTHROCYTE [DISTWIDTH] IN BLOOD BY AUTOMATED COUNT: 13.4 %
FASTING STATUS PATIENT QL REPORTED: NO
GLOBULIN PLAS-MCNC: 2.9 G/DL (ref 2–3.5)
GLUCOSE BLD-MCNC: 111 MG/DL (ref 70–99)
HCT VFR BLD AUTO: 34.8 % (ref 35–48)
HGB BLD-MCNC: 12 G/DL (ref 12–16)
IMM GRANULOCYTES # BLD AUTO: 0.01 X10(3) UL (ref 0–1)
IMM GRANULOCYTES NFR BLD: 0.2 %
LYMPHOCYTES # BLD AUTO: 1.26 X10(3) UL (ref 1–4)
LYMPHOCYTES NFR BLD AUTO: 20.8 %
MCH RBC QN AUTO: 30.6 PG (ref 26–34)
MCHC RBC AUTO-ENTMCNC: 34.5 G/DL (ref 31–37)
MCV RBC AUTO: 88.8 FL (ref 80–100)
MONOCYTES # BLD AUTO: 0.49 X10(3) UL (ref 0.1–1)
MONOCYTES NFR BLD AUTO: 8.1 %
NEUTROPHILS # BLD AUTO: 4.18 X10 (3) UL (ref 1.5–7.7)
NEUTROPHILS # BLD AUTO: 4.18 X10(3) UL (ref 1.5–7.7)
NEUTROPHILS NFR BLD AUTO: 69.1 %
OSMOLALITY SERPL CALC.SUM OF ELEC: 294 MOSM/KG (ref 275–295)
PLATELET # BLD AUTO: 213 10(3)UL (ref 150–450)
POTASSIUM SERPL-SCNC: 4.1 MMOL/L (ref 3.5–5.1)
PROT SERPL-MCNC: 7.2 G/DL (ref 5.7–8.2)
RBC # BLD AUTO: 3.92 X10(6)UL (ref 3.8–5.3)
SODIUM SERPL-SCNC: 141 MMOL/L (ref 136–145)
TSI SER-ACNC: 3.51 UIU/ML (ref 0.55–4.78)
WBC # BLD AUTO: 6.1 X10(3) UL (ref 4–11)

## 2025-07-24 RX ORDER — DIPHENHYDRAMINE HYDROCHLORIDE 50 MG/ML
25 INJECTION, SOLUTION INTRAMUSCULAR; INTRAVENOUS ONCE
Status: COMPLETED | OUTPATIENT
Start: 2025-07-24 | End: 2025-07-24

## 2025-07-24 RX ORDER — DIPHENHYDRAMINE HYDROCHLORIDE 50 MG/ML
25 INJECTION, SOLUTION INTRAMUSCULAR; INTRAVENOUS ONCE
Status: CANCELLED | OUTPATIENT
Start: 2025-07-24

## 2025-07-24 RX ORDER — FAMOTIDINE 10 MG/ML
20 INJECTION, SOLUTION INTRAVENOUS ONCE
Status: COMPLETED | OUTPATIENT
Start: 2025-07-24 | End: 2025-07-24

## 2025-07-24 RX ORDER — CETIRIZINE HYDROCHLORIDE 10 MG/1
10 TABLET ORAL ONCE
Status: COMPLETED | OUTPATIENT
Start: 2025-07-24 | End: 2025-07-24

## 2025-07-24 RX ORDER — FAMOTIDINE 10 MG/ML
20 INJECTION, SOLUTION INTRAVENOUS ONCE
Status: CANCELLED | OUTPATIENT
Start: 2025-07-24

## 2025-07-24 RX ADMIN — DIPHENHYDRAMINE HYDROCHLORIDE 25 MG: 50 INJECTION, SOLUTION INTRAMUSCULAR; INTRAVENOUS at 11:36:00

## 2025-07-24 RX ADMIN — FAMOTIDINE 20 MG: 10 INJECTION, SOLUTION INTRAVENOUS at 11:38:00

## 2025-07-24 RX ADMIN — CETIRIZINE HYDROCHLORIDE 10 MG: 10 TABLET ORAL at 12:11:00

## 2025-07-24 RX ADMIN — DIPHENHYDRAMINE HYDROCHLORIDE 25 MG: 50 INJECTION, SOLUTION INTRAMUSCULAR; INTRAVENOUS at 12:11:00

## 2025-07-24 NOTE — PROGRESS NOTES
Pt here for C1D1 Drug(s)Keytruda/Taxol/Carbo.  Arrives Ambulating independently, accompanied by Friend     Patient was evaluated today by MD.    Oral medications included in this regimen:  no    Patient confirms comprehension of cancer treatment schedule:  yes    Pregnancy screening:  Not applicable    Modifications in dose or schedule:  taxol given at half rate for first 15 minutes of infusion    Medications appearance and physical integrity checked by RN: yes.    Chemotherapy IV pump settings verified by 2 RNs:  Yes.  Frequency of blood return and site check throughout administration: Prior to administration and At completion of therapy     Infusion/treatment outcome:  patient tolerated treatment without incident      After premeds finished, patient became very itchy around port dressing site.  Dressing changed without relief.  Shari edward made aware and orders received to give additional benadryl 25mg ivp now and zyrtec 10mg po now.      Education Record    Learner:  Patient and Friend  Barriers / Limitations:  None  Method:  Brief focused  Education / instructions given:  nausea meds and upcoming appt schedules reviewed  Outcome:  Shows understanding    Discharged Home, Ambulating independently, accompanied by:Friend    Patient/family verbalized understanding of future appointments: by MyChart messaging

## 2025-07-24 NOTE — PATIENT INSTRUCTIONS
Day of treatment    5:00 pm       Ondansetron      9:00 pm     Prochlorperazine                                Next day following treatment      9:00 am     Ondansetron      1:00 pm     Prochlorperazine      5:00 pm     Ondansetron      9:00 pm     Prochlorperazine              Take Prochlorperazine every 6 hours as needed.

## 2025-07-24 NOTE — PROGRESS NOTES
[facility] Progress Note      Patient Name: Tasneem Small   YOB: 1960  Medical Record Number: [id number]  Attending Physician: Nikolai Davis M.D.      The 21st Century Cures Act makes medical notes like these available to patients in the interest of transparency. Please be advised this is a medical document. Medical documents are intended to carry relevant information, facts as evident, and the clinical opinion of the practitioner. The medical note is intended as peer to peer communication and may appear blunt or direct. It is written in medical language and may contain abbreviations or verbiage that are unfamiliar.      Date of Visit: 7/24/2025        Chief Complaint  Invasive ductal carcinoma, left breast - follow up and treatment.     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     On 06/17/2025 she underwent bilateral diagnostic mammography and left breast ultrasound for a self palpated left breast mass that showed a 3.1 cm mass at the 2 o'clock position in the left breast and an adjacent 0.5 cm mass.     On 06/23/2025 she underwent ultrasound guided biopsy of the left breast mass. Pathology showed grade 3 invasive ductal carcinoma. Immunohistochemical studies were as follows: estrogen receptor <1%, progesterone receptor <1%, Her2 1+, Ki67 50%. She was staged as T2N0M0.     On 07/24/2025 she began neoadjuvant systemic therapy with carboplatin, paclitaxel, and pembrolizumab.     History of Present Illness  Patient returns for follow up and treatment. No new complaints.     Performance Status   Karnofsky 100% - Normal, no complaints.     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     Past Surgical History (historical data, reviewed by physician)   Right breast lumpectomy; ; cardiac pacemaker placement (for bradycardia); cholecystectomy; hysterectomy; oral surgery; tonsillectomy.     Family History (historical data, reviewed by physician)   No known family history of malignancy.     Social History (historical data, reviewed by physician)   Daily cannabis user (vaping).       Current Medications   prochlorperazine (COMPAZINE) 10 mg tablet Take 1 tablet (10 mg total) by mouth every 6 (six) hours as needed for Nausea. 30 tablet 3    ondansetron (ZOFRAN) 8 MG tablet Take 1 tablet (8 mg total) by mouth every 8 (eight) hours as needed for Nausea. 30 tablet 3    ergocalciferol 1.25 MG (14247 UT) Oral Cap Take 1 capsule (50,000 Units total) by mouth once a week. 8 capsule 0    losartan 25 MG Oral Tab Take 1 tablet (25 mg total) by mouth daily. 90 tablet 1    lamoTRIgine 25 MG Oral Tab Take 4 tablets (100 mg total) by mouth daily. 360 tablet 1    latanoprost 0.005 % Ophthalmic Solution Place 1 drop into both eyes nightly.       Allergies   Ms. Small has no known allergies.       Vital Signs   Height: 154.6 cm (5' 0.87\") (938)  Weight: 69.5 kg (153 lb 3.2 oz) (938)  BSA (Calculated - sq m): 1.68 sq meters (938)  Pulse: 76 (938)  BP: 146/77 (938)  Temp: 98.2 °F (36.8 °C) (938)  Do Not Use - Resp Rate: --  SpO2: 97 % (938)     Physical Examination   Constitutional  Well developed and well nourished; in no apparent distress.  Head   Normocephalic and atraumatic.  Eyes   Conjunctiva clear; sclera anicteric.  ENMT   External nose normal; external ears normal.  Respiratory  Normal effort; no respiratory distress; clear to auscultation bilaterally.   Cardiovascular  Regular rate and rhythm; normal S1S2.   Abdomen  Not distended.   Extremities  No lower extremity edema.  Neurologic  Motor and sensory grossly  intact.  Psychiatric  Mood and affect appropriate.      Laboratory   Recent Results (from the past 48 hours)   CBC W Differential W Platelet    Collection Time: 07/24/25  9:39 AM   Result Value Ref Range    WBC 6.1 4.0 - 11.0 x10(3) uL    RBC 3.92 3.80 - 5.30 x10(6)uL    HGB 12.0 12.0 - 16.0 g/dL    HCT 34.8 (L) 35.0 - 48.0 %    .0 150.0 - 450.0 10(3)uL    MCV 88.8 80.0 - 100.0 fL    MCH 30.6 26.0 - 34.0 pg    MCHC 34.5 31.0 - 37.0 g/dL    RDW 13.4 %    Neutrophil Absolute Prelim 4.18 1.50 - 7.70 x10 (3) uL    Neutrophil Absolute 4.18 1.50 - 7.70 x10(3) uL    Lymphocyte Absolute 1.26 1.00 - 4.00 x10(3) uL    Monocyte Absolute 0.49 0.10 - 1.00 x10(3) uL    Eosinophil Absolute 0.08 0.00 - 0.70 x10(3) uL    Basophil Absolute 0.03 0.00 - 0.20 x10(3) uL    Immature Granulocyte Absolute 0.01 0.00 - 1.00 x10(3) uL    Neutrophil % 69.1 %    Lymphocyte % 20.8 %    Monocyte % 8.1 %    Eosinophil % 1.3 %    Basophil % 0.5 %    Immature Granulocyte % 0.2 %   Comp Metabolic Panel (14)    Collection Time: 07/24/25  9:39 AM   Result Value Ref Range    Glucose 111 (H) 70 - 99 mg/dL    Sodium 141 136 - 145 mmol/L    Potassium 4.1 3.5 - 5.1 mmol/L    Chloride 106 98 - 112 mmol/L    CO2 28.0 21.0 - 32.0 mmol/L    Anion Gap 7 0 - 18 mmol/L    BUN 17 9 - 23 mg/dL    Creatinine 0.83 0.55 - 1.02 mg/dL    Calcium, Total 9.1 8.7 - 10.6 mg/dL    Calculated Osmolality 294 275 - 295 mOsm/kg    eGFR-Cr 78 >=60 mL/min/1.73m2    AST 21 <34 U/L    ALT 25 10 - 49 U/L    Alkaline Phosphatase 70 55 - 142 U/L    Bilirubin, Total 0.7 0.2 - 1.1 mg/dL    Total Protein 7.2 5.7 - 8.2 g/dL    Albumin 4.3 3.2 - 4.8 g/dL    Globulin  2.9 2.0 - 3.5 g/dL    A/G Ratio 1.5 1.0 - 2.0    Patient Fasting for CMP? No    TSH W REFLEX TO FREE T4 [E]    Collection Time: 07/24/25  9:39 AM   Result Value Ref Range    TSH 3.513 0.550 - 4.780 uIU/mL     Impression and Plan   1.   Invasive ductal carcinoma, left breast: Patient is staged as T2N0M0. Her tumor is  estrogen and progesterone receptor negative and Her2 1+.           Proceed with C1D1 of neoadjvuant carboplatin/paclitaxel/pembrolizumab.    2.   Vitamin D deficiency: Complete high dose replacement therapy.     Planned Follow Up   Patient will return weekly for treatment.    Electronically Signed by:     Nikolai Davis M.D.  System Medical Director, Oncology Services  [facility]

## 2025-07-24 NOTE — PROGRESS NOTES
Referring Provider:                    Nikolai Davis MD             Additional Provider(s):              MD Kathy Penaloza      Reason for Referral:  Tasneem Small had genetic testing performed on 07/08/25 because of a personal history of breast cancer and family history of cancer.     Genetic Testing Result:  NEGATIVE - No known pathogenic variants were found in 70 genes including: AIP, ALK, APC*, SANTI*, AXIN2, BAP1, BARD1, BLM, BMPR1A, BRCA1, BRCA2, BRIP1, CDC73, CDH1, CDK4, CDKN1B, CDKN2A (p14ARF), CDKN2A (h10LYD0x), CHEK2, CTNNA1, DICER1*, EGFR, EPCAM*, FH*, FLCN, GREM1*, HOXB13, KIT, LZTR1, MAX*, MBD4, MEN1*, MET*, MITF, MLH1*, MSH2*, MSH3*, MSH6*, MUTYH, NF1*, NF2, NTHL1, PALB2, PDGFRA, PMS2*, POLD1*, POLE, POT1, NAMXJ9F, PTCH1, PTEN*, RAD51C, RAD51D, RB1*, RET, SDHA*, SDHAF2, SDHB, SDHC*, SDHD, SMAD4, SMARCA4, SMARCB1, SMARCE1, STK11, SUFU, EEJH853, TP53, TSC1*, TSC2, VHL. Please refer to the report from [employer] ([id number]) for additional testing information. These results were discussed with Ms. Small by phone on 07/24/25.      Summary and Plan:  These results indicate that it is unlikely that Ms. Small has a pathogenic variant in any of the genes listed above. The limitations of the testing include the chance that a pathogenic variant in a gene other than those included in this analysis might be the cause of cancer in  or her relatives. I encouraged Ms. Small to contact me on an annual basis to see if there have been any updates in genetic testing that would apply to her or to inform me if there are any changes to the family history.    In the meantime, Ms. Small and her relatives should speak with their physicians to discuss recommended medical management according to their personal and family history.    Cc:  Tasneem Small

## 2025-07-24 NOTE — PROGRESS NOTES
SW completed an assessment with pt to provide support and encouragement due to new chemo starting today.        met with patient and her friend for introduction and role explanation. Distress Screening is not on file/documented at time of assessment.    Patient lives in [place] with her spouse and MIL.   Pt reports that she has one son and is a  for children with special needs.  Pt reports that she is working to obtain guardianship of her hsb who had a stroke and is wc bound.     educated on FMLA/STD benefits, encouraging patient/family to confirm benefits with employer if needed. Provided Forms Department flyer as well for any paperwork that may be required.     Social Determinants of Health assessment completed with patient and no needs identified at this time.     educated on Power of  for Healthcare, providing document for review; Patient is aware to reach out if they choose to complete.    Educated on available resources, providing Social Work Support Packet including Cancer Center Support Services, [facility], Transportation, and Home Care contacts. Educated on BHI if desired. Contact provided and family is aware to reach out with any needs. Bringing Ruthie Bag given, which patient was grateful for.      [email or web address]    Elisabeth LOPEZ, MICKIEW  [facility] Licensed Clinical

## 2025-07-25 ENCOUNTER — TELEPHONE (OUTPATIENT)
Facility: LOCATION | Age: 65
End: 2025-07-25

## 2025-07-25 NOTE — TELEPHONE ENCOUNTER
Toxicities: C1 D1 Paclitaxel/Carboplatin/Pembrolizumab on 7/24/2025     I attempted to reach Tasneem to see how she is feeling after her first treatment. I left a voice mail message asking her to please return my call at her earliest convenience.

## 2025-07-30 RX ORDER — FAMOTIDINE 10 MG/ML
20 INJECTION, SOLUTION INTRAVENOUS ONCE
Status: CANCELLED | OUTPATIENT
Start: 2025-07-31

## 2025-07-30 RX ORDER — DIPHENHYDRAMINE HYDROCHLORIDE 50 MG/ML
25 INJECTION, SOLUTION INTRAMUSCULAR; INTRAVENOUS ONCE
Status: CANCELLED | OUTPATIENT
Start: 2025-07-31

## 2025-07-31 ENCOUNTER — OFFICE VISIT (OUTPATIENT)
Facility: LOCATION | Age: 65
End: 2025-07-31
Attending: SPECIALIST

## 2025-07-31 ENCOUNTER — OFFICE VISIT (OUTPATIENT)
Facility: LOCATION | Age: 65
End: 2025-07-31
Attending: SPECIALIST
Payer: COMMERCIAL

## 2025-07-31 VITALS
RESPIRATION RATE: 16 BRPM | HEART RATE: 74 BPM | DIASTOLIC BLOOD PRESSURE: 69 MMHG | WEIGHT: 152.19 LBS | SYSTOLIC BLOOD PRESSURE: 115 MMHG | TEMPERATURE: 97 F | BODY MASS INDEX: 29 KG/M2 | OXYGEN SATURATION: 97 %

## 2025-07-31 DIAGNOSIS — Z51.12 ENCOUNTER FOR ANTINEOPLASTIC IMMUNOTHERAPY: ICD-10-CM

## 2025-07-31 DIAGNOSIS — Z17.1 MALIGNANT NEOPLASM OF UPPER-OUTER QUADRANT OF LEFT BREAST IN FEMALE, ESTROGEN RECEPTOR NEGATIVE (HCC): Primary | ICD-10-CM

## 2025-07-31 DIAGNOSIS — C50.412 MALIGNANT NEOPLASM OF UPPER-OUTER QUADRANT OF LEFT BREAST IN FEMALE, ESTROGEN RECEPTOR NEGATIVE (HCC): Primary | ICD-10-CM

## 2025-07-31 DIAGNOSIS — E55.9 VITAMIN D DEFICIENCY: ICD-10-CM

## 2025-07-31 DIAGNOSIS — R11.10 VOMITING DUE TO CHEMOTHERAPY: ICD-10-CM

## 2025-07-31 DIAGNOSIS — Z79.69 ON ANTINEOPLASTIC CHEMOTHERAPY: ICD-10-CM

## 2025-07-31 DIAGNOSIS — T45.1X5A VOMITING DUE TO CHEMOTHERAPY: ICD-10-CM

## 2025-07-31 LAB
ALBUMIN SERPL-MCNC: 4.4 G/DL (ref 3.2–4.8)
ALBUMIN/GLOB SERPL: 1.6 (ref 1–2)
ALP LIVER SERPL-CCNC: 65 U/L (ref 55–142)
ALT SERPL-CCNC: 20 U/L (ref 10–49)
ANION GAP SERPL CALC-SCNC: 7 MMOL/L (ref 0–18)
AST SERPL-CCNC: 19 U/L (ref ?–34)
BASOPHILS # BLD AUTO: 0.03 X10(3) UL (ref 0–0.2)
BASOPHILS NFR BLD AUTO: 0.6 %
BILIRUB SERPL-MCNC: 0.5 MG/DL (ref 0.2–1.1)
BUN BLD-MCNC: 15 MG/DL (ref 9–23)
CALCIUM BLD-MCNC: 9.2 MG/DL (ref 8.7–10.6)
CHLORIDE SERPL-SCNC: 106 MMOL/L (ref 98–112)
CO2 SERPL-SCNC: 28 MMOL/L (ref 21–32)
CREAT BLD-MCNC: 0.8 MG/DL (ref 0.55–1.02)
EGFRCR SERPLBLD CKD-EPI 2021: 81 ML/MIN/1.73M2 (ref 60–?)
EOSINOPHIL # BLD AUTO: 0.07 X10(3) UL (ref 0–0.7)
EOSINOPHIL NFR BLD AUTO: 1.5 %
ERYTHROCYTE [DISTWIDTH] IN BLOOD BY AUTOMATED COUNT: 12.9 %
GLOBULIN PLAS-MCNC: 2.7 G/DL (ref 2–3.5)
GLUCOSE BLD-MCNC: 105 MG/DL (ref 70–99)
HCT VFR BLD AUTO: 33.7 % (ref 35–48)
HGB BLD-MCNC: 11.6 G/DL (ref 12–16)
IMM GRANULOCYTES # BLD AUTO: 0.02 X10(3) UL (ref 0–1)
IMM GRANULOCYTES NFR BLD: 0.4 %
LYMPHOCYTES # BLD AUTO: 1.05 X10(3) UL (ref 1–4)
LYMPHOCYTES NFR BLD AUTO: 21.8 %
MCH RBC QN AUTO: 30.5 PG (ref 26–34)
MCHC RBC AUTO-ENTMCNC: 34.4 G/DL (ref 31–37)
MCV RBC AUTO: 88.7 FL (ref 80–100)
MONOCYTES # BLD AUTO: 0.33 X10(3) UL (ref 0.1–1)
MONOCYTES NFR BLD AUTO: 6.8 %
NEUTROPHILS # BLD AUTO: 3.32 X10 (3) UL (ref 1.5–7.7)
NEUTROPHILS # BLD AUTO: 3.32 X10(3) UL (ref 1.5–7.7)
NEUTROPHILS NFR BLD AUTO: 68.9 %
OSMOLALITY SERPL CALC.SUM OF ELEC: 293 MOSM/KG (ref 275–295)
PLATELET # BLD AUTO: 210 10(3)UL (ref 150–450)
POTASSIUM SERPL-SCNC: 4.4 MMOL/L (ref 3.5–5.1)
PROT SERPL-MCNC: 7.1 G/DL (ref 5.7–8.2)
RBC # BLD AUTO: 3.8 X10(6)UL (ref 3.8–5.3)
SODIUM SERPL-SCNC: 141 MMOL/L (ref 136–145)
WBC # BLD AUTO: 4.8 X10(3) UL (ref 4–11)

## 2025-07-31 RX ORDER — PALONOSETRON 0.05 MG/ML
0.25 INJECTION, SOLUTION INTRAVENOUS ONCE
Status: COMPLETED | OUTPATIENT
Start: 2025-07-31 | End: 2025-07-31

## 2025-07-31 RX ORDER — FAMOTIDINE 10 MG/ML
20 INJECTION, SOLUTION INTRAVENOUS ONCE
Status: COMPLETED | OUTPATIENT
Start: 2025-07-31 | End: 2025-07-31

## 2025-07-31 RX ORDER — DIPHENHYDRAMINE HYDROCHLORIDE 50 MG/ML
25 INJECTION, SOLUTION INTRAMUSCULAR; INTRAVENOUS ONCE
Status: CANCELLED | OUTPATIENT
Start: 2025-08-07

## 2025-07-31 RX ORDER — PALONOSETRON 0.05 MG/ML
0.25 INJECTION, SOLUTION INTRAVENOUS ONCE
Status: CANCELLED
Start: 2025-07-31

## 2025-07-31 RX ORDER — FAMOTIDINE 10 MG/ML
20 INJECTION, SOLUTION INTRAVENOUS ONCE
Status: CANCELLED | OUTPATIENT
Start: 2025-08-07

## 2025-07-31 RX ORDER — PALONOSETRON 0.05 MG/ML
0.25 INJECTION, SOLUTION INTRAVENOUS ONCE
Status: CANCELLED
Start: 2025-08-07

## 2025-07-31 RX ORDER — DIPHENHYDRAMINE HYDROCHLORIDE 50 MG/ML
25 INJECTION, SOLUTION INTRAMUSCULAR; INTRAVENOUS ONCE
Status: COMPLETED | OUTPATIENT
Start: 2025-07-31 | End: 2025-07-31

## 2025-07-31 RX ADMIN — DIPHENHYDRAMINE HYDROCHLORIDE 25 MG: 50 INJECTION, SOLUTION INTRAMUSCULAR; INTRAVENOUS at 12:02:00

## 2025-07-31 RX ADMIN — FAMOTIDINE 20 MG: 10 INJECTION, SOLUTION INTRAVENOUS at 11:58:00

## 2025-07-31 RX ADMIN — PALONOSETRON 0.25 MG: 0.05 INJECTION, SOLUTION INTRAVENOUS at 12:00:00

## 2025-08-07 ENCOUNTER — OFFICE VISIT (OUTPATIENT)
Facility: LOCATION | Age: 65
End: 2025-08-07
Attending: SPECIALIST

## 2025-08-07 ENCOUNTER — APPOINTMENT (OUTPATIENT)
Facility: LOCATION | Age: 65
End: 2025-08-07
Attending: SPECIALIST

## 2025-08-07 VITALS
DIASTOLIC BLOOD PRESSURE: 67 MMHG | OXYGEN SATURATION: 96 % | WEIGHT: 151.81 LBS | TEMPERATURE: 98 F | RESPIRATION RATE: 16 BRPM | BODY MASS INDEX: 28.66 KG/M2 | SYSTOLIC BLOOD PRESSURE: 123 MMHG | HEART RATE: 76 BPM | HEIGHT: 60.87 IN

## 2025-08-07 DIAGNOSIS — Z79.69 ON ANTINEOPLASTIC CHEMOTHERAPY: Primary | ICD-10-CM

## 2025-08-07 DIAGNOSIS — C50.412 MALIGNANT NEOPLASM OF UPPER-OUTER QUADRANT OF LEFT BREAST IN FEMALE, ESTROGEN RECEPTOR NEGATIVE (HCC): Primary | ICD-10-CM

## 2025-08-07 DIAGNOSIS — Z17.1 MALIGNANT NEOPLASM OF UPPER-OUTER QUADRANT OF LEFT BREAST IN FEMALE, ESTROGEN RECEPTOR NEGATIVE (HCC): Primary | ICD-10-CM

## 2025-08-07 LAB
BASOPHILS # BLD AUTO: 0.03 X10(3) UL (ref 0–0.2)
BASOPHILS NFR BLD AUTO: 0.8 %
EOSINOPHIL # BLD AUTO: 0.03 X10(3) UL (ref 0–0.7)
EOSINOPHIL NFR BLD AUTO: 0.8 %
ERYTHROCYTE [DISTWIDTH] IN BLOOD BY AUTOMATED COUNT: 13.1 %
HCT VFR BLD AUTO: 31.4 % (ref 35–48)
HGB BLD-MCNC: 10.9 G/DL (ref 12–16)
IMM GRANULOCYTES # BLD AUTO: 0.01 X10(3) UL (ref 0–1)
IMM GRANULOCYTES NFR BLD: 0.3 %
LYMPHOCYTES # BLD AUTO: 1.09 X10(3) UL (ref 1–4)
LYMPHOCYTES NFR BLD AUTO: 28.9 %
MCH RBC QN AUTO: 30.8 PG (ref 26–34)
MCHC RBC AUTO-ENTMCNC: 34.7 G/DL (ref 31–37)
MCV RBC AUTO: 88.7 FL (ref 80–100)
MONOCYTES # BLD AUTO: 0.44 X10(3) UL (ref 0.1–1)
MONOCYTES NFR BLD AUTO: 11.7 %
NEUTROPHILS # BLD AUTO: 2.17 X10 (3) UL (ref 1.5–7.7)
NEUTROPHILS # BLD AUTO: 2.17 X10(3) UL (ref 1.5–7.7)
NEUTROPHILS NFR BLD AUTO: 57.5 %
PLATELET # BLD AUTO: 200 10(3)UL (ref 150–450)
RBC # BLD AUTO: 3.54 X10(6)UL (ref 3.8–5.3)
WBC # BLD AUTO: 3.8 X10(3) UL (ref 4–11)

## 2025-08-07 RX ORDER — DIPHENHYDRAMINE HYDROCHLORIDE 50 MG/ML
25 INJECTION, SOLUTION INTRAMUSCULAR; INTRAVENOUS ONCE
Status: COMPLETED | OUTPATIENT
Start: 2025-08-07 | End: 2025-08-07

## 2025-08-07 RX ORDER — PALONOSETRON 0.05 MG/ML
0.25 INJECTION, SOLUTION INTRAVENOUS ONCE
Status: COMPLETED | OUTPATIENT
Start: 2025-08-07 | End: 2025-08-07

## 2025-08-07 RX ORDER — FAMOTIDINE 10 MG/ML
20 INJECTION, SOLUTION INTRAVENOUS ONCE
Status: COMPLETED | OUTPATIENT
Start: 2025-08-07 | End: 2025-08-07

## 2025-08-07 RX ORDER — LIDOCAINE AND PRILOCAINE 25; 25 MG/G; MG/G
CREAM TOPICAL
Qty: 5 G | Refills: 1 | Status: SHIPPED | OUTPATIENT
Start: 2025-08-07

## 2025-08-07 RX ADMIN — DIPHENHYDRAMINE HYDROCHLORIDE 25 MG: 50 INJECTION, SOLUTION INTRAMUSCULAR; INTRAVENOUS at 11:29:00

## 2025-08-07 RX ADMIN — FAMOTIDINE 20 MG: 10 INJECTION, SOLUTION INTRAVENOUS at 11:27:00

## 2025-08-07 RX ADMIN — PALONOSETRON 0.25 MG: 0.05 INJECTION, SOLUTION INTRAVENOUS at 11:28:00

## 2025-08-13 ENCOUNTER — NURSE ONLY (OUTPATIENT)
Facility: LOCATION | Age: 65
End: 2025-08-13
Attending: SPECIALIST

## 2025-08-13 DIAGNOSIS — C50.412 MALIGNANT NEOPLASM OF UPPER-OUTER QUADRANT OF LEFT BREAST IN FEMALE, ESTROGEN RECEPTOR NEGATIVE (HCC): Primary | ICD-10-CM

## 2025-08-13 DIAGNOSIS — Z17.1 MALIGNANT NEOPLASM OF UPPER-OUTER QUADRANT OF LEFT BREAST IN FEMALE, ESTROGEN RECEPTOR NEGATIVE (HCC): Primary | ICD-10-CM

## 2025-08-13 LAB
ALBUMIN SERPL-MCNC: 4.3 G/DL (ref 3.2–4.8)
ALBUMIN/GLOB SERPL: 1.5 (ref 1–2)
ALP LIVER SERPL-CCNC: 69 U/L (ref 55–142)
ALT SERPL-CCNC: 19 U/L (ref 10–49)
ANION GAP SERPL CALC-SCNC: 8 MMOL/L (ref 0–18)
AST SERPL-CCNC: 13 U/L (ref ?–34)
BASOPHILS # BLD AUTO: 0.02 X10(3) UL (ref 0–0.2)
BASOPHILS NFR BLD AUTO: 0.5 %
BILIRUB SERPL-MCNC: 0.6 MG/DL (ref 0.2–1.1)
BUN BLD-MCNC: 18 MG/DL (ref 9–23)
CALCIUM BLD-MCNC: 9.1 MG/DL (ref 8.7–10.6)
CHLORIDE SERPL-SCNC: 107 MMOL/L (ref 98–112)
CO2 SERPL-SCNC: 27 MMOL/L (ref 21–32)
CREAT BLD-MCNC: 0.84 MG/DL (ref 0.55–1.02)
EGFRCR SERPLBLD CKD-EPI 2021: 77 ML/MIN/1.73M2 (ref 60–?)
EOSINOPHIL # BLD AUTO: 0.03 X10(3) UL (ref 0–0.7)
EOSINOPHIL NFR BLD AUTO: 0.7 %
ERYTHROCYTE [DISTWIDTH] IN BLOOD BY AUTOMATED COUNT: 13.6 %
GLOBULIN PLAS-MCNC: 2.8 G/DL (ref 2–3.5)
GLUCOSE BLD-MCNC: 119 MG/DL (ref 70–99)
HCT VFR BLD AUTO: 32.5 % (ref 35–48)
HGB BLD-MCNC: 11.3 G/DL (ref 12–16)
IMM GRANULOCYTES # BLD AUTO: 0.03 X10(3) UL (ref 0–1)
IMM GRANULOCYTES NFR BLD: 0.7 %
LYMPHOCYTES # BLD AUTO: 1.03 X10(3) UL (ref 1–4)
LYMPHOCYTES NFR BLD AUTO: 24.3 %
MCH RBC QN AUTO: 31.2 PG (ref 26–34)
MCHC RBC AUTO-ENTMCNC: 34.8 G/DL (ref 31–37)
MCV RBC AUTO: 89.8 FL (ref 80–100)
MONOCYTES # BLD AUTO: 0.27 X10(3) UL (ref 0.1–1)
MONOCYTES NFR BLD AUTO: 6.4 %
NEUTROPHILS # BLD AUTO: 2.86 X10 (3) UL (ref 1.5–7.7)
NEUTROPHILS # BLD AUTO: 2.86 X10(3) UL (ref 1.5–7.7)
NEUTROPHILS NFR BLD AUTO: 67.4 %
OSMOLALITY SERPL CALC.SUM OF ELEC: 297 MOSM/KG (ref 275–295)
PLATELET # BLD AUTO: 193 10(3)UL (ref 150–450)
POTASSIUM SERPL-SCNC: 4.2 MMOL/L (ref 3.5–5.1)
PROT SERPL-MCNC: 7.1 G/DL (ref 5.7–8.2)
RBC # BLD AUTO: 3.62 X10(6)UL (ref 3.8–5.3)
SODIUM SERPL-SCNC: 142 MMOL/L (ref 136–145)
WBC # BLD AUTO: 4.2 X10(3) UL (ref 4–11)

## 2025-08-14 ENCOUNTER — OFFICE VISIT (OUTPATIENT)
Facility: LOCATION | Age: 65
End: 2025-08-14
Attending: SPECIALIST

## 2025-08-14 VITALS
HEART RATE: 69 BPM | OXYGEN SATURATION: 97 % | DIASTOLIC BLOOD PRESSURE: 60 MMHG | SYSTOLIC BLOOD PRESSURE: 126 MMHG | HEIGHT: 60.87 IN | WEIGHT: 155 LBS | RESPIRATION RATE: 16 BRPM | BODY MASS INDEX: 29.27 KG/M2 | TEMPERATURE: 98 F

## 2025-08-14 DIAGNOSIS — C50.412 MALIGNANT NEOPLASM OF UPPER-OUTER QUADRANT OF LEFT BREAST IN FEMALE, ESTROGEN RECEPTOR NEGATIVE (HCC): Primary | ICD-10-CM

## 2025-08-14 DIAGNOSIS — Z17.1 MALIGNANT NEOPLASM OF UPPER-OUTER QUADRANT OF LEFT BREAST IN FEMALE, ESTROGEN RECEPTOR NEGATIVE (HCC): Primary | ICD-10-CM

## 2025-08-14 LAB
T4 FREE SERPL-MCNC: 1.3 NG/DL (ref 0.8–1.7)
TSI SER-ACNC: 2.39 UIU/ML (ref 0.55–4.78)

## 2025-08-14 RX ORDER — FAMOTIDINE 10 MG/ML
20 INJECTION, SOLUTION INTRAVENOUS ONCE
OUTPATIENT
Start: 2025-08-28

## 2025-08-14 RX ORDER — PALONOSETRON 0.05 MG/ML
0.25 INJECTION, SOLUTION INTRAVENOUS ONCE
Status: COMPLETED | OUTPATIENT
Start: 2025-08-14 | End: 2025-08-14

## 2025-08-14 RX ORDER — DIPHENHYDRAMINE HYDROCHLORIDE 50 MG/ML
25 INJECTION, SOLUTION INTRAMUSCULAR; INTRAVENOUS ONCE
OUTPATIENT
Start: 2025-08-21

## 2025-08-14 RX ORDER — DIPHENHYDRAMINE HYDROCHLORIDE 50 MG/ML
25 INJECTION, SOLUTION INTRAMUSCULAR; INTRAVENOUS ONCE
OUTPATIENT
Start: 2025-08-28

## 2025-08-14 RX ORDER — PALONOSETRON 0.05 MG/ML
0.25 INJECTION, SOLUTION INTRAVENOUS ONCE
Start: 2025-08-21 | End: 2025-08-21

## 2025-08-14 RX ORDER — PALONOSETRON 0.05 MG/ML
0.25 INJECTION, SOLUTION INTRAVENOUS ONCE
Start: 2025-08-28 | End: 2025-08-28

## 2025-08-14 RX ORDER — FAMOTIDINE 10 MG/ML
20 INJECTION, SOLUTION INTRAVENOUS ONCE
Status: COMPLETED | OUTPATIENT
Start: 2025-08-14 | End: 2025-08-14

## 2025-08-14 RX ORDER — DIPHENHYDRAMINE HYDROCHLORIDE 50 MG/ML
25 INJECTION, SOLUTION INTRAMUSCULAR; INTRAVENOUS ONCE
Status: COMPLETED | OUTPATIENT
Start: 2025-08-14 | End: 2025-08-14

## 2025-08-14 RX ORDER — NETARSUDIL AND LATANOPROST OPHTHALMIC SOLUTION, 0.02%/0.005% .2; .05 MG/ML; MG/ML
1 SOLUTION/ DROPS OPHTHALMIC; TOPICAL NIGHTLY
COMMUNITY
Start: 2025-08-04

## 2025-08-14 RX ORDER — FAMOTIDINE 10 MG/ML
20 INJECTION, SOLUTION INTRAVENOUS ONCE
OUTPATIENT
Start: 2025-08-21

## 2025-08-14 RX ADMIN — DIPHENHYDRAMINE HYDROCHLORIDE 25 MG: 50 INJECTION, SOLUTION INTRAMUSCULAR; INTRAVENOUS at 11:31:00

## 2025-08-14 RX ADMIN — PALONOSETRON 0.25 MG: 0.05 INJECTION, SOLUTION INTRAVENOUS at 11:32:00

## 2025-08-14 RX ADMIN — FAMOTIDINE 20 MG: 10 INJECTION, SOLUTION INTRAVENOUS at 11:31:00

## 2025-08-21 ENCOUNTER — OFFICE VISIT (OUTPATIENT)
Facility: LOCATION | Age: 65
End: 2025-08-21
Attending: SPECIALIST

## 2025-08-21 VITALS
TEMPERATURE: 98 F | RESPIRATION RATE: 16 BRPM | HEART RATE: 75 BPM | HEIGHT: 60.87 IN | BODY MASS INDEX: 28.7 KG/M2 | DIASTOLIC BLOOD PRESSURE: 74 MMHG | SYSTOLIC BLOOD PRESSURE: 136 MMHG | OXYGEN SATURATION: 98 % | WEIGHT: 152 LBS

## 2025-08-21 DIAGNOSIS — C50.412 MALIGNANT NEOPLASM OF UPPER-OUTER QUADRANT OF LEFT BREAST IN FEMALE, ESTROGEN RECEPTOR NEGATIVE (HCC): Primary | ICD-10-CM

## 2025-08-21 DIAGNOSIS — Z17.1 MALIGNANT NEOPLASM OF UPPER-OUTER QUADRANT OF LEFT BREAST IN FEMALE, ESTROGEN RECEPTOR NEGATIVE (HCC): Primary | ICD-10-CM

## 2025-08-21 LAB
BASOPHILS # BLD AUTO: 0.02 X10(3) UL (ref 0–0.2)
BASOPHILS NFR BLD AUTO: 0.4 %
EOSINOPHIL # BLD AUTO: 0.04 X10(3) UL (ref 0–0.7)
EOSINOPHIL NFR BLD AUTO: 0.9 %
ERYTHROCYTE [DISTWIDTH] IN BLOOD BY AUTOMATED COUNT: 13.6 %
HCT VFR BLD AUTO: 31.3 % (ref 35–48)
HGB BLD-MCNC: 11.1 G/DL (ref 12–16)
IMM GRANULOCYTES # BLD AUTO: 0.01 X10(3) UL (ref 0–1)
IMM GRANULOCYTES NFR BLD: 0.2 %
LYMPHOCYTES # BLD AUTO: 1.21 X10(3) UL (ref 1–4)
LYMPHOCYTES NFR BLD AUTO: 26.3 %
MCH RBC QN AUTO: 31.6 PG (ref 26–34)
MCHC RBC AUTO-ENTMCNC: 35.5 G/DL (ref 31–37)
MCV RBC AUTO: 89.2 FL (ref 80–100)
MONOCYTES # BLD AUTO: 0.31 X10(3) UL (ref 0.1–1)
MONOCYTES NFR BLD AUTO: 6.7 %
NEUTROPHILS # BLD AUTO: 3.01 X10 (3) UL (ref 1.5–7.7)
NEUTROPHILS # BLD AUTO: 3.01 X10(3) UL (ref 1.5–7.7)
NEUTROPHILS NFR BLD AUTO: 65.5 %
PLATELET # BLD AUTO: 158 10(3)UL (ref 150–450)
RBC # BLD AUTO: 3.51 X10(6)UL (ref 3.8–5.3)
WBC # BLD AUTO: 4.6 X10(3) UL (ref 4–11)

## 2025-08-21 RX ORDER — DIPHENHYDRAMINE HYDROCHLORIDE 50 MG/ML
25 INJECTION, SOLUTION INTRAMUSCULAR; INTRAVENOUS ONCE
Status: COMPLETED | OUTPATIENT
Start: 2025-08-21 | End: 2025-08-21

## 2025-08-21 RX ORDER — FAMOTIDINE 10 MG/ML
20 INJECTION, SOLUTION INTRAVENOUS ONCE
Status: COMPLETED | OUTPATIENT
Start: 2025-08-21 | End: 2025-08-21

## 2025-08-21 RX ORDER — PALONOSETRON 0.05 MG/ML
0.25 INJECTION, SOLUTION INTRAVENOUS ONCE
Status: COMPLETED | OUTPATIENT
Start: 2025-08-21 | End: 2025-08-21

## 2025-08-21 RX ADMIN — FAMOTIDINE 20 MG: 10 INJECTION, SOLUTION INTRAVENOUS at 11:48:00

## 2025-08-21 RX ADMIN — DIPHENHYDRAMINE HYDROCHLORIDE 25 MG: 50 INJECTION, SOLUTION INTRAMUSCULAR; INTRAVENOUS at 11:48:00

## 2025-08-21 RX ADMIN — PALONOSETRON 0.25 MG: 0.05 INJECTION, SOLUTION INTRAVENOUS at 11:48:00

## 2025-08-28 ENCOUNTER — OFFICE VISIT (OUTPATIENT)
Facility: LOCATION | Age: 65
End: 2025-08-28
Attending: SPECIALIST

## 2025-08-28 VITALS
SYSTOLIC BLOOD PRESSURE: 124 MMHG | TEMPERATURE: 98 F | BODY MASS INDEX: 29.11 KG/M2 | RESPIRATION RATE: 16 BRPM | HEIGHT: 60.87 IN | DIASTOLIC BLOOD PRESSURE: 67 MMHG | HEART RATE: 71 BPM | WEIGHT: 154.19 LBS | OXYGEN SATURATION: 97 %

## 2025-08-28 DIAGNOSIS — C50.412 MALIGNANT NEOPLASM OF UPPER-OUTER QUADRANT OF LEFT BREAST IN FEMALE, ESTROGEN RECEPTOR NEGATIVE (HCC): Primary | ICD-10-CM

## 2025-08-28 DIAGNOSIS — Z17.1 MALIGNANT NEOPLASM OF UPPER-OUTER QUADRANT OF LEFT BREAST IN FEMALE, ESTROGEN RECEPTOR NEGATIVE (HCC): Primary | ICD-10-CM

## 2025-08-28 LAB
BASOPHILS # BLD AUTO: 0.02 X10(3) UL (ref 0–0.2)
BASOPHILS NFR BLD AUTO: 0.6 %
EOSINOPHIL # BLD AUTO: 0.06 X10(3) UL (ref 0–0.7)
EOSINOPHIL NFR BLD AUTO: 1.9 %
ERYTHROCYTE [DISTWIDTH] IN BLOOD BY AUTOMATED COUNT: 14.2 %
HCT VFR BLD AUTO: 28.5 % (ref 35–48)
HGB BLD-MCNC: 10 G/DL (ref 12–16)
IMM GRANULOCYTES # BLD AUTO: 0.02 X10(3) UL (ref 0–1)
IMM GRANULOCYTES NFR BLD: 0.6 %
LYMPHOCYTES # BLD AUTO: 0.95 X10(3) UL (ref 1–4)
LYMPHOCYTES NFR BLD AUTO: 29.9 %
MCH RBC QN AUTO: 31.8 PG (ref 26–34)
MCHC RBC AUTO-ENTMCNC: 35.1 G/DL (ref 31–37)
MCV RBC AUTO: 90.8 FL (ref 80–100)
MONOCYTES # BLD AUTO: 0.33 X10(3) UL (ref 0.1–1)
MONOCYTES NFR BLD AUTO: 10.4 %
NEUTROPHILS # BLD AUTO: 1.8 X10 (3) UL (ref 1.5–7.7)
NEUTROPHILS # BLD AUTO: 1.8 X10(3) UL (ref 1.5–7.7)
NEUTROPHILS NFR BLD AUTO: 56.6 %
PLATELET # BLD AUTO: 226 10(3)UL (ref 150–450)
RBC # BLD AUTO: 3.14 X10(6)UL (ref 3.8–5.3)
WBC # BLD AUTO: 3.2 X10(3) UL (ref 4–11)

## 2025-08-28 RX ORDER — DIPHENHYDRAMINE HYDROCHLORIDE 50 MG/ML
25 INJECTION, SOLUTION INTRAMUSCULAR; INTRAVENOUS ONCE
Status: COMPLETED | OUTPATIENT
Start: 2025-08-28 | End: 2025-08-28

## 2025-08-28 RX ORDER — PALONOSETRON 0.05 MG/ML
0.25 INJECTION, SOLUTION INTRAVENOUS ONCE
Status: COMPLETED | OUTPATIENT
Start: 2025-08-28 | End: 2025-08-28

## 2025-08-28 RX ORDER — FAMOTIDINE 10 MG/ML
20 INJECTION, SOLUTION INTRAVENOUS ONCE
Status: COMPLETED | OUTPATIENT
Start: 2025-08-28 | End: 2025-08-28

## 2025-08-28 RX ADMIN — DIPHENHYDRAMINE HYDROCHLORIDE 25 MG: 50 INJECTION, SOLUTION INTRAMUSCULAR; INTRAVENOUS at 11:50:00

## 2025-08-28 RX ADMIN — DIPHENHYDRAMINE HYDROCHLORIDE 25 MG: 50 INJECTION, SOLUTION INTRAMUSCULAR; INTRAVENOUS at 12:19:00

## 2025-08-28 RX ADMIN — FAMOTIDINE 20 MG: 10 INJECTION, SOLUTION INTRAVENOUS at 11:50:00

## 2025-08-28 RX ADMIN — PALONOSETRON 0.25 MG: 0.05 INJECTION, SOLUTION INTRAVENOUS at 11:55:00

## 2025-10-20 ENCOUNTER — APPOINTMENT (OUTPATIENT)
Dept: CARDIOLOGY | Age: 65
End: 2025-10-20

## (undated) DEVICE — COLUMN DRAPE

## (undated) DEVICE — Device: Brand: DEFENDO AIR/WATER/SUCTION AND BIOPSY VALVE

## (undated) DEVICE — SUTURE PDS II SZ 1 L60IN ABSRB VLT TP-1 L65MM

## (undated) DEVICE — GOWN,SIRUS,FABRIC-REINFORCED,X-LARGE: Brand: MEDLINE

## (undated) DEVICE — ARM DRAPE

## (undated) DEVICE — TRAY CATH 16FR F INCL BARDX IC COMPLT CARE

## (undated) DEVICE — HUNTER GASPER TIP, DISPOSABLE: Brand: RENEW

## (undated) DEVICE — DRESS WOUND AQUACEL 3.5INX6INL

## (undated) DEVICE — VIOLET BRAIDED (POLYGLACTIN 910), SYNTHETIC ABSORBABLE SUTURE: Brand: COATED VICRYL

## (undated) DEVICE — SEAL

## (undated) DEVICE — SUTURE ETHLN SZ 2-0 L18IN NONABSORB BLK

## (undated) DEVICE — ECHELON CONTOUR W/ GREEN RELOAD: Brand: ECHELON

## (undated) DEVICE — Device

## (undated) DEVICE — 1200CC GUARDIAN II: Brand: GUARDIAN

## (undated) DEVICE — SUTURE VCRL SZ 0 L54IN ABSRB UD POLYGLACTIN

## (undated) DEVICE — REDUCER: Brand: ENDOWRIST

## (undated) DEVICE — PROXIMATE LINEAR CUTTER RELOAD, BLUE, 75MM: Brand: PROXIMATE

## (undated) DEVICE — E-Z CLEAN, NON-STICK, PTFE COATED, ELECTROSURGICAL BLADE ELECTRODE, MODIFIED EXTENDED INSULATION, 4 INCH (10.2 CM): Brand: MEGADYNE

## (undated) DEVICE — 2, DISPOSABLE SUCTION/IRRIGATOR WITHOUT DISPOSABLE TIP: Brand: STRYKEFLOW

## (undated) DEVICE — SUTURE VCRL SZ 0 L18IN ABSRB UD POLYGLACTIN

## (undated) DEVICE — SUTURE VCRL SZ 3-0 L27IN ABSRB UD L26MM SH

## (undated) DEVICE — SUTURE VCRL SZ 0 L27IN ABSRB UD L36MM CT-1

## (undated) DEVICE — PROXIMATE SKIN STAPLERS (35 WIDE) CONTAINS 35 STAINLESS STEEL STAPLES (FIXED HEAD): Brand: PROXIMATE

## (undated) DEVICE — DRESS WOUND AQUACEL 3.5INX10IN

## (undated) DEVICE — CANNULA SEAL

## (undated) DEVICE — LAPCLINCH GRASPER TIP, DISPOSABLE: Brand: RENEW

## (undated) DEVICE — LAPAROVUE VISIBILITY SYSTEM LAPAROSCOPIC SOLUTIONS: Brand: LAPAROVUE

## (undated) DEVICE — PROXIMATE RELOADABLE LINEAR CUTTER WITH SAFETY LOCK-OUT, 75MM: Brand: PROXIMATE

## (undated) DEVICE — ENDOSCOPY PACK - LOWER: Brand: MEDLINE INDUSTRIES, INC.

## (undated) DEVICE — MEDI-VAC TUBING CONNECTOR 6-IN-1 "Y" POLYPROPYLENE: Brand: CARDINAL HEALTH

## (undated) DEVICE — SIGMOIDOSCOPE LIGHTED BIOSEAL

## (undated) DEVICE — ADHESIVE SKIN TOP FOR WND CLSR DERMBND ADV

## (undated) DEVICE — SUTURE PDS II 1 CT-1

## (undated) DEVICE — SET CYSTO IRRIG L77IN DIA0.241IN BLDR NVENT

## (undated) DEVICE — LAP COLON CDS: Brand: MEDLINE INDUSTRIES, INC.

## (undated) DEVICE — 3M™ RED DOT™ MONITORING ELECTRODE WITH FOAM TAPE AND STICKY GEL, 50/BAG, 20/CASE, 72/PLT 2570: Brand: RED DOT™

## (undated) DEVICE — LIGHT HANDLE

## (undated) DEVICE — DRAPE,TOP,102X53,STERILE: Brand: MEDLINE

## (undated) DEVICE — LARGE, DISPOSABLE ALEXIS O C-SECTION PROTECTOR - RETRACTOR: Brand: ALEXIS ® O C-SECTION PROTECTOR - RETRACTOR

## (undated) DEVICE — REM POLYHESIVE ADULT PATIENT RETURN ELECTRODE: Brand: VALLEYLAB

## (undated) DEVICE — UNDYED BRAIDED (POLYGLACTIN 910), SYNTHETIC ABSORBABLE SUTURE: Brand: COATED VICRYL

## (undated) DEVICE — COVER,MAYO STAND,STERILE: Brand: MEDLINE

## (undated) DEVICE — SYRINGE MED 30ML STD CLR PLAS LL TIP N CTRL

## (undated) DEVICE — ENSEAL 20 CM SHAFT, LARGE JAW: Brand: ENSEAL X1

## (undated) DEVICE — SKN PREP SPNG STKS PVP PNT STR: Brand: MEDLINE INDUSTRIES, INC.

## (undated) DEVICE — BAG DRNGE 2000ML URIN INF CTRL ANTI REFLX

## (undated) DEVICE — BLADELESS OBTURATOR: Brand: WECK VISTA

## (undated) DEVICE — ENDOPATH ULTRA VERESS INSUFFLATION NEEDLES WITH LUER LOCK CONNECTORS: Brand: ENDOPATH

## (undated) DEVICE — FILTERLINE NASAL ADULT O2/CO2

## (undated) DEVICE — SOLUTION PREP 4OZ 10% POVIDONE IOD SCR TOP

## (undated) DEVICE — 40580 - THE PINK PAD - ADVANCED TRENDELENBURG POSITIONING KIT: Brand: 40580 - THE PINK PAD - ADVANCED TRENDELENBURG POSITIONING KIT

## (undated) DEVICE — TIP-UP FENESTRATED GRASPER: Brand: ENDOWRIST

## (undated) DEVICE — VESSEL SEALER EXTEND: Brand: ENDOWRIST

## (undated) DEVICE — TROCAR: Brand: KII SHIELDED BLADED ACCESS SYSTEM

## (undated) DEVICE — MONOPOLAR CURVED SCISSORS: Brand: ENDOWRIST

## (undated) DEVICE — TIP COVER ACCESSORY

## (undated) DEVICE — STERILE POLYISOPRENE POWDER-FREE SURGICAL GLOVES: Brand: PROTEXIS

## (undated) DEVICE — SLEEVE COMPR M KNEE LEN SGL USE KENDALL SCD

## (undated) DEVICE — SUTURE PROL SZ 2-0 L30IN NONABSORB BLU

## (undated) DEVICE — STAPLER 60: Brand: SUREFORM

## (undated) DEVICE — ENDOCUT SCISSOR TIP, DISPOSABLE: Brand: RENEW

## (undated) DEVICE — CIRCULAR MECH XL SEAL 25MM

## (undated) DEVICE — CADIERE FORCEPS: Brand: ENDOWRIST

## (undated) DEVICE — CLOSING BUNDLE: Brand: MEDLINE INDUSTRIES, INC.

## (undated) NOTE — LETTER
[facility] Cardiac Device Communication Tool    Preop to complete    [address] Phone: [phone] Fax: [phone]   Patient Name Rubens Reyna   Patient  - AGE - SEX 1960 - A: 57 y - female   Surgical Date 10/23/2023   Surgical Procedure ROBOTIC ASSISTED LOW ANTERIOR COLON RESECTION, POSSIBLE OPEN   Surgical Location [facility]   Type of cautery anticipated: Monopolar         Device Clinic to Complete the Information Below, Sign and Fax to [phone]    Pacemaker or ICD    Atrial or atrial-ventricular lead?     Indication for device    Is patient pacemaker dependent? Has pt had routine f/u and is battery life > 3 months    Is ICD programmed to inhibit therapy w/magnet? Does device have rate response or other sensor?       Surgical Procedure above [facility] Surgical Procedure @ [facility] and below   < 6 in. from ICD: Reprogram therapies OFF w/  asynchronous pacing if PM dependent  < 6 in. from PM: Reprogram asynchronous if PM   dependent ICD: No Change  PM: No Change   > 6 in. from ICD: Magnet*  > 6 in. from PM:  No Change*  * If PM dependent observe for pacing inhibition and minimize cautery if inhibition is seen                                              Bipolar cautery: No Change   Cardiac Device Management Plan (check one)            ___  Reprogram (PAT Dept to provide Rep w/ arrival date/time)   ___ Magnet    ___ No Change    Comments: ___________________________________________________________________        Signature: ________________________________ Date: ____________ Time: ______________     Print Name: ___________________________________

## (undated) NOTE — ED AVS SNAPSHOT
Minoo Ronquillo   MRN: [id number]    Department:  [facility] Emergency Department   Date of Visit:  11/2/2019           Disclosure     Insurance plans vary and the physician(s) referred by the ER may not be covered by your plan.  Please contact tell this physician (or your personal doctor if your instructions are to return to your personal doctor) about any new or lasting problems. The primary care or specialist physician will see patients referred from the [facility] Emergency Department.  Michael Gimenez

## (undated) NOTE — LETTER
Date: 4/19/2021    Patient Name: Jayant Hall          To Whom it may concern: This letter has been written at the patient's request. The above patient was seen at the [facility] for treatment of a medical condition.     This patient

## (undated) NOTE — Clinical Note
Thank you for allowing me to care for your patient! She will start with chemotherapy and then surgery for her breast cancer. Thanks, Silas Rios